# Patient Record
Sex: MALE | Race: WHITE | URBAN - METROPOLITAN AREA
[De-identification: names, ages, dates, MRNs, and addresses within clinical notes are randomized per-mention and may not be internally consistent; named-entity substitution may affect disease eponyms.]

---

## 2018-01-24 ENCOUNTER — OFFICE VISIT (OUTPATIENT)
Dept: FAMILY MEDICINE CLINIC | Age: 68
End: 2018-01-24

## 2018-01-24 VITALS
HEIGHT: 74 IN | OXYGEN SATURATION: 94 % | DIASTOLIC BLOOD PRESSURE: 80 MMHG | TEMPERATURE: 97.8 F | BODY MASS INDEX: 34.37 KG/M2 | RESPIRATION RATE: 12 BRPM | WEIGHT: 267.8 LBS | HEART RATE: 67 BPM | SYSTOLIC BLOOD PRESSURE: 110 MMHG

## 2018-01-24 DIAGNOSIS — J06.9 UPPER RESPIRATORY INFECTION, ACUTE: ICD-10-CM

## 2018-01-24 DIAGNOSIS — J20.9 ACUTE BRONCHITIS, UNSPECIFIED ORGANISM: Primary | ICD-10-CM

## 2018-01-24 DIAGNOSIS — Z96.642 H/O TOTAL HIP ARTHROPLASTY, LEFT: ICD-10-CM

## 2018-01-24 PROCEDURE — 99213 OFFICE O/P EST LOW 20 MIN: CPT | Performed by: PHYSICIAN ASSISTANT

## 2018-01-24 RX ORDER — BENZONATATE 100 MG/1
200 CAPSULE ORAL 3 TIMES DAILY PRN
Qty: 30 CAPSULE | Refills: 0 | Status: SHIPPED | OUTPATIENT
Start: 2018-01-24 | End: 2022-04-18

## 2018-01-24 RX ORDER — AZITHROMYCIN 250 MG/1
250 TABLET, FILM COATED ORAL DAILY
Qty: 1 PACKET | Refills: 0 | Status: SHIPPED | OUTPATIENT
Start: 2018-01-24 | End: 2022-04-18

## 2018-01-24 NOTE — PATIENT INSTRUCTIONS
good.  When should you call for help? Call 911 anytime you think you may need emergency care. For example, call if:  ? · You have severe trouble breathing. ?Call your doctor now or seek immediate medical care if:  ? · You have new or worse trouble breathing. ? · You cough up dark brown or bloody mucus (sputum). ? · You have a new or higher fever. ? · You have a new rash. ? Watch closely for changes in your health, and be sure to contact your doctor if:  ? · You cough more deeply or more often, especially if you notice more mucus or a change in the color of your mucus. ? · You are not getting better as expected. Where can you learn more? Go to https://[employer]. org and sign in to your [employer]Fig account. Enter H333 in the SETiT box to learn more about \"Bronchitis: Care Instructions. \"     If you do not have an account, please click on the \"Sign Up Now\" link. Current as of: May 12, 2017  Content Version: 11.5  © 1179-6806 Healthwise, Incorporated. Care instructions adapted under license by [facility] ([facility]). If you have questions about a medical condition or this instruction, always ask your healthcare professional. [address] any warranty or liability for your use of this information.

## 2018-01-24 NOTE — PROGRESS NOTES
[address]        CC:    Chief Complaint   Patient presents with    Cough     x 3 wks     Congestion     Chest x 3 weeks        HPI:  Kevin Tyler is a 78 y.o. male complains of as per chief complaint. This  Began with head congestion and moved to chest.  Symptoms :  sore throat: No.   Head congestion: Yes. Headache: No.   Sinus pressure: No.  Rhinorrhea: Yes- with white exudates. Ear fullness/ otalgia: No, with tinnitus. Fever or chills: No.   Myalgias: No. Malaise: No.     Cough is dry and tickling. No phlegm. Loss of appetite: Yes. Nausea: No. Denies rash. History of asthma or allergies: No . Smoke: No.   Sick contacts: Yes. Flu vax received: No.    Remedies tried include: OTC meds    ROS:  Reviewed and otherwise negative for other constitutional, neurologic, ENT, cardiac, pulmonary, GI,  or other musculoskeletal or extremity complaints. HISTORY: Reviewed    MEDICATIONS:  No current outpatient prescriptions on file prior to visit. No current facility-administered medications on file prior to visit. ALLERGIES:  No Known Allergies    PHYSICAL EXAMINATION:  /80   Pulse 67   Temp 97.8 °F (36.6 °C)   Resp 12   Ht 6' 2\" (1.88 m)   Wt 267 lb 12.8 oz (121.5 kg)   SpO2 94%   BMI 34.38 kg/m²   GENERAL APPEARANCE:  Pleasant, friendly. Well-nourished. Looks in no distress. HEAD: Normocephalic. EYES:  EOMI, PERRLA. Conjunctivae pink and moist. Sclera white. EARS:   Negative pinna pull. Left canal with cerumen impacted, Right canal-clear. TM with clear bulge. No mastoid tenderness. NOSE:   No septal deviation. Nares are boggy and congested. MOUTH:   Lips without lesions or cracking. Moist mucosa. THROAT: erythemic Without exudates,  edema. NECK:no  lymphadenopathy. HEART:  Regular rate and rhythm. No murmurs, rubs, or gallops. LUNGS: Clear to auscultation without wheezes, rales, or rhonchi. Equal resonance to percussion.   ABDOMEN:

## 2022-04-18 ENCOUNTER — OFFICE VISIT (OUTPATIENT)
Dept: PRIMARY CARE CLINIC | Age: 72
End: 2022-04-18
Payer: COMMERCIAL

## 2022-04-18 VITALS
DIASTOLIC BLOOD PRESSURE: 77 MMHG | HEART RATE: 63 BPM | HEIGHT: 74 IN | WEIGHT: 260 LBS | SYSTOLIC BLOOD PRESSURE: 151 MMHG | BODY MASS INDEX: 33.37 KG/M2 | TEMPERATURE: 97.2 F

## 2022-04-18 DIAGNOSIS — Z96.642 H/O TOTAL HIP ARTHROPLASTY, LEFT: ICD-10-CM

## 2022-04-18 DIAGNOSIS — M75.102 LEFT ROTATOR CUFF TEAR ARTHROPATHY: Primary | ICD-10-CM

## 2022-04-18 DIAGNOSIS — M99.00 SOMATIC DYSFUNCTION OF HEAD REGION: ICD-10-CM

## 2022-04-18 DIAGNOSIS — M99.03 SOMATIC DYSFUNCTION OF LUMBAR REGION: ICD-10-CM

## 2022-04-18 DIAGNOSIS — M99.05 SOMATIC DYSFUNCTION OF PELVIC REGION: ICD-10-CM

## 2022-04-18 DIAGNOSIS — M12.812 LEFT ROTATOR CUFF TEAR ARTHROPATHY: Primary | ICD-10-CM

## 2022-04-18 DIAGNOSIS — M99.01 SOMATIC DYSFUNCTION OF CERVICAL REGION: ICD-10-CM

## 2022-04-18 DIAGNOSIS — M99.07 SOMATIC DYSFUNCTION OF UPPER EXTREMITY: ICD-10-CM

## 2022-04-18 DIAGNOSIS — M47.817 LUMBOSACRAL SPONDYLOSIS WITHOUT MYELOPATHY: ICD-10-CM

## 2022-04-18 PROBLEM — R03.0 FINDING OF ABOVE NORMAL BLOOD PRESSURE: Status: ACTIVE | Noted: 2022-02-28

## 2022-04-18 PROBLEM — B35.1 ONYCHOMYCOSIS: Status: ACTIVE | Noted: 2022-02-28

## 2022-04-18 PROBLEM — E03.8 SUBCLINICAL HYPOTHYROIDISM: Status: ACTIVE | Noted: 2022-02-13

## 2022-04-18 PROBLEM — K63.5 POLYP OF COLON: Status: ACTIVE | Noted: 2022-02-28

## 2022-04-18 PROCEDURE — 99203 OFFICE O/P NEW LOW 30 MIN: CPT | Performed by: STUDENT IN AN ORGANIZED HEALTH CARE EDUCATION/TRAINING PROGRAM

## 2022-04-18 PROCEDURE — 98927 OSTEOPATH MANJ 5-6 REGIONS: CPT | Performed by: STUDENT IN AN ORGANIZED HEALTH CARE EDUCATION/TRAINING PROGRAM

## 2022-04-18 PROCEDURE — G8417 CALC BMI ABV UP PARAM F/U: HCPCS | Performed by: STUDENT IN AN ORGANIZED HEALTH CARE EDUCATION/TRAINING PROGRAM

## 2022-04-18 PROCEDURE — 4040F PNEUMOC VAC/ADMIN/RCVD: CPT | Performed by: STUDENT IN AN ORGANIZED HEALTH CARE EDUCATION/TRAINING PROGRAM

## 2022-04-18 PROCEDURE — 1123F ACP DISCUSS/DSCN MKR DOCD: CPT | Performed by: STUDENT IN AN ORGANIZED HEALTH CARE EDUCATION/TRAINING PROGRAM

## 2022-04-18 PROCEDURE — 1036F TOBACCO NON-USER: CPT | Performed by: STUDENT IN AN ORGANIZED HEALTH CARE EDUCATION/TRAINING PROGRAM

## 2022-04-18 PROCEDURE — G8428 CUR MEDS NOT DOCUMENT: HCPCS | Performed by: STUDENT IN AN ORGANIZED HEALTH CARE EDUCATION/TRAINING PROGRAM

## 2022-04-18 PROCEDURE — 3017F COLORECTAL CA SCREEN DOC REV: CPT | Performed by: STUDENT IN AN ORGANIZED HEALTH CARE EDUCATION/TRAINING PROGRAM

## 2022-04-18 ASSESSMENT — ENCOUNTER SYMPTOMS
CONSTIPATION: 0
DIARRHEA: 0
BACK PAIN: 1

## 2022-04-18 NOTE — PATIENT INSTRUCTIONS

## 2022-04-18 NOTE — PROGRESS NOTES
[facility]  Osteopathic Manipulative Treatment  2022     Patient:  Miley Bragg                                               : 1950  Age: 80 y.o. Date : 2022    Miley Bragg is a 80 y.o. male who presents for :      Chief Complaint   Patient presents with    Other     omt, body pain all over        ASSESSMENT/PLAN    Osteopathic structural exam performed on cranial, cervical, thoracic, lumbar, pelvic and sacral regions. Somatic dysfunction diagnoses: Right anterior pelvic torsion, L2-4 left muscle hypertonicity, left glenohumeral joint restriction, upper crossed syndrome, C3 extended rotated side bent right, bilateral suboccipital mm restriction. Osteopathic manipulative treatment (OMT) performed: Muscle energy pelvis, myofascial release lumbar spine, Bharath technique left glenohumeral joint, suboccipital release, CV 4, trapezius blast, thoracic outlet release. Patient reports increased range of motion and decreased tenderness in areas treated. Patient advised of possible side effects including generalized myalgias for 24 hours after treatment. Diagnosis Orders   1. Left rotator cuff tear arthropathy     2. Lumbosacral spondylosis without myelopathy     3. H/O total hip arthroplasty, left     4. Somatic dysfunction of lumbar region     5. Somatic dysfunction of pelvic region     6. Somatic dysfunction of head region     7. Somatic dysfunction of upper extremity     8. Somatic dysfunction of cervical region          Return in about 2 weeks (around 2022) for OMT . Complete MRI as scheduled for left shoulder to assess extent of rotator cuff tear, continue ibuprofen as needed. Counseling given on proper posture. Generalized flexibility improvement via routine stretching recommended. Follow-up with PCP for health maintenance screening, elevated blood pressure. HPI  Patient presents today for osteopathic assessment and treatment.     Patient has a history of lumbar DDD, left hip OA status post total arthroplasty, left rotator cuff tear. He notes a generalized feeling of stiffness, left shoulder pain. When he walks [facility], he states that he experiences fatigue, joint pain which precludes him from completing a walk. Exercise includes half hour of weightlifting per week in addition to walking. He admittedly does not stretch. He has not seen a PCP for the past 3 years, but states that he completed screening blood work through his [employer], and the results were normal.  It is unclear which labs are completed. He is a right side sleeper. He is left-handed. He is currently following with orthopedic surgery for work-up of left rotator cuff arthropathy. Patient works as a salesperson for [employer], and notes that he spends a significant amount of time driving or seated at a computer. Review of Systems   Constitutional: Negative for activity change, fever and unexpected weight change. Eyes: Negative for visual disturbance. Cardiovascular: Negative for leg swelling. Gastrointestinal: Negative for constipation and diarrhea. Genitourinary: Negative for difficulty urinating and dysuria. Musculoskeletal: Positive for arthralgias, back pain and neck stiffness. Negative for gait problem, joint swelling and neck pain. Neurological: Negative for weakness, numbness and headaches. Past Medical History:   Diagnosis Date    Former cigarette smoker 8/20/2013    Hip pain     Lumbar radiculitis 9/18/2013    Primary localized osteoarthrosis, pelvic region and thigh 9/4/2013    Subclinical hypothyroidism 2/13/2022       Past Surgical History:   Procedure Laterality Date    EYE SURGERY Left 1959    eye trauma     KNEE SURGERY Right 10yo       No current outpatient medications on file. No current facility-administered medications for this visit.        BP (!) 151/77 (Site: Right Upper Arm, Position: Sitting, Cuff Size: Large Adult)   Pulse 63 Temp 97.2 °F (36.2 °C) (Temporal)   Ht 6' 2\" (1.88 m)   Wt 260 lb (117.9 kg)   BMI 33.38 kg/m²     PHYSICAL EXAM  Physical Exam  Vitals reviewed. Constitutional:       General: He is not in acute distress. Appearance: Normal appearance. He is obese. HENT:      Head: Normocephalic and atraumatic. Musculoskeletal:         General: No deformity or signs of injury. Normal range of motion. Cervical back: Normal range of motion. Right lower leg: No edema. Left lower leg: No edema. Skin:     General: Skin is warm and dry. Capillary Refill: Capillary refill takes less than 2 seconds. Neurological:      General: No focal deficit present. Mental Status: He is alert. Sensory: No sensory deficit. Motor: No weakness. Gait: Gait normal.   Psychiatric:         Mood and Affect: Mood normal.         Behavior: Behavior normal.         Compa Dan DO       This dictation was generated by voice recognition computer software. Although all attempts are made to edit the dictation for accuracy, there may be errors in the transcription that are not intended.

## 2022-05-02 ENCOUNTER — OFFICE VISIT (OUTPATIENT)
Dept: PRIMARY CARE CLINIC | Age: 72
End: 2022-05-02
Payer: COMMERCIAL

## 2022-05-02 VITALS
WEIGHT: 255 LBS | TEMPERATURE: 96.6 F | BODY MASS INDEX: 32.73 KG/M2 | DIASTOLIC BLOOD PRESSURE: 75 MMHG | HEIGHT: 74 IN | HEART RATE: 64 BPM | SYSTOLIC BLOOD PRESSURE: 144 MMHG

## 2022-05-02 DIAGNOSIS — M75.102 NONTRAUMATIC TEAR OF LEFT ROTATOR CUFF, UNSPECIFIED TEAR EXTENT: Primary | ICD-10-CM

## 2022-05-02 DIAGNOSIS — M99.00 SOMATIC DYSFUNCTION OF HEAD REGION: ICD-10-CM

## 2022-05-02 DIAGNOSIS — E66.09 CLASS 1 OBESITY DUE TO EXCESS CALORIES WITH SERIOUS COMORBIDITY IN ADULT, UNSPECIFIED BMI: ICD-10-CM

## 2022-05-02 DIAGNOSIS — M99.07 SOMATIC DYSFUNCTION OF UPPER EXTREMITY: ICD-10-CM

## 2022-05-02 DIAGNOSIS — R03.0 ELEVATED BLOOD PRESSURE READING IN OFFICE WITHOUT DIAGNOSIS OF HYPERTENSION: ICD-10-CM

## 2022-05-02 DIAGNOSIS — M99.01 SOMATIC DYSFUNCTION OF CERVICAL REGION: ICD-10-CM

## 2022-05-02 PROBLEM — E66.811 CLASS 1 OBESITY DUE TO EXCESS CALORIES WITH SERIOUS COMORBIDITY IN ADULT: Status: ACTIVE | Noted: 2022-05-02

## 2022-05-02 PROCEDURE — 1123F ACP DISCUSS/DSCN MKR DOCD: CPT | Performed by: STUDENT IN AN ORGANIZED HEALTH CARE EDUCATION/TRAINING PROGRAM

## 2022-05-02 PROCEDURE — 99213 OFFICE O/P EST LOW 20 MIN: CPT | Performed by: STUDENT IN AN ORGANIZED HEALTH CARE EDUCATION/TRAINING PROGRAM

## 2022-05-02 PROCEDURE — 3017F COLORECTAL CA SCREEN DOC REV: CPT | Performed by: STUDENT IN AN ORGANIZED HEALTH CARE EDUCATION/TRAINING PROGRAM

## 2022-05-02 PROCEDURE — 4040F PNEUMOC VAC/ADMIN/RCVD: CPT | Performed by: STUDENT IN AN ORGANIZED HEALTH CARE EDUCATION/TRAINING PROGRAM

## 2022-05-02 PROCEDURE — G8427 DOCREV CUR MEDS BY ELIG CLIN: HCPCS | Performed by: STUDENT IN AN ORGANIZED HEALTH CARE EDUCATION/TRAINING PROGRAM

## 2022-05-02 PROCEDURE — 98926 OSTEOPATH MANJ 3-4 REGIONS: CPT | Performed by: STUDENT IN AN ORGANIZED HEALTH CARE EDUCATION/TRAINING PROGRAM

## 2022-05-02 PROCEDURE — 1036F TOBACCO NON-USER: CPT | Performed by: STUDENT IN AN ORGANIZED HEALTH CARE EDUCATION/TRAINING PROGRAM

## 2022-05-02 PROCEDURE — G8417 CALC BMI ABV UP PARAM F/U: HCPCS | Performed by: STUDENT IN AN ORGANIZED HEALTH CARE EDUCATION/TRAINING PROGRAM

## 2022-05-02 ASSESSMENT — ENCOUNTER SYMPTOMS
CONSTIPATION: 0
BACK PAIN: 1
DIARRHEA: 0

## 2022-05-02 NOTE — PROGRESS NOTES
[facility]  Osteopathic Manipulative Treatment  2022     Patient:  Livier Montero                                               : 1950  Age: 80 y.o. Date : 2022    Livier Montero is a 80 y.o. male who presents for :      Chief Complaint   Patient presents with    Other     omt        ASSESSMENT/PLAN    Osteopathic structural exam performed on cranial, cervical, thoracic, lumbar, pelvic and sacral regions. Somatic dysfunction diagnoses: C2-C7 extended rotated side bent left, bilateral glenohumeral joint restriction, OA rotated left. Osteopathic manipulative treatment (OMT) performed: Suboccipital release, CV 4, muscle energy cervical spine, Bharath technique bilateral glenohumeral joint. Patient reports increased range of motion and decreased tenderness in areas treated. Patient advised of possible side effects including generalized myalgias for 24 hours after treatment. Diagnosis Orders   1. Nontraumatic tear of left rotator cuff, unspecified tear extent   pain, range of motion restriction improving with OMT and home physical therapy exercises. Continue to monitor progress. 2. Somatic dysfunction of head region   per above   3. Somatic dysfunction of cervical region   per above   4. Somatic dysfunction of upper extremity   per above   5. Elevated blood pressure reading in office without diagnosis of hypertension   follow-up for physical, recheck blood pressure and initiate antihypertensive if persistently elevated   6. Class 1 obesity due to excess calories with serious comorbidity in adult, unspecified BMI   complicates all aspects of patient's care        Return in about 2 weeks (around 2022) for annual physical.    HPI  Patient presents today for follow-up OMT visit for neck, upper back pain, left rotator cuff tear. Endorses continued tightness in his neck and upper back.   Since our previous appointment, he has increase his cardiovascular exercise to 3 times per week. He has found hills in his neighborhood to hike. Notes increased endurance. He has not initiated a stretching routine. He denies numbness, weakness or tingling of his bilateral upper extremity. Overall, pain in his left shoulder site of his rotator cuff tearhas been improving. Review of Systems   Constitutional: Negative for activity change, fever and unexpected weight change. Eyes: Negative for visual disturbance. Cardiovascular: Negative for leg swelling. Gastrointestinal: Negative for constipation and diarrhea. Musculoskeletal: Positive for back pain and neck pain. Negative for gait problem and neck stiffness. Neurological: Negative for weakness, numbness and headaches. Past Medical History:   Diagnosis Date    Class 1 obesity due to excess calories with serious comorbidity in adult 5/2/2022    Former cigarette smoker 8/20/2013    Hip pain     Lumbar radiculitis 9/18/2013    Primary localized osteoarthrosis, pelvic region and thigh 9/4/2013    Subclinical hypothyroidism 2/13/2022       Past Surgical History:   Procedure Laterality Date    EYE SURGERY Left 1959    eye trauma     KNEE SURGERY Right 8yo       No current outpatient medications on file. No current facility-administered medications for this visit. BP (!) 144/75 (Site: Right Upper Arm, Position: Sitting, Cuff Size: Large Adult)   Pulse 64   Temp 96.6 °F (35.9 °C) (Temporal)   Ht 6' 2\" (1.88 m)   Wt 255 lb (115.7 kg)   BMI 32.74 kg/m²     PHYSICAL EXAM  Physical Exam  Vitals reviewed. Constitutional:       General: He is not in acute distress. Appearance: Normal appearance. HENT:      Head: Normocephalic and atraumatic. Musculoskeletal:         General: Tenderness present. No deformity or signs of injury. Cervical back: Normal range of motion. Right lower leg: No edema. Left lower leg: No edema. Skin:     General: Skin is warm and dry.       Capillary Refill: Capillary refill takes less than 2 seconds. Neurological:      General: No focal deficit present. Mental Status: He is alert. Sensory: No sensory deficit. Motor: No weakness. Gait: Gait normal.   Psychiatric:         Mood and Affect: Mood normal.         Behavior: Behavior normal.         Robel Sarah DO       This dictation was generated by voice recognition computer software. Although all attempts are made to edit the dictation for accuracy, there may be errors in the transcription that are not intended.

## 2022-06-01 ENCOUNTER — OFFICE VISIT (OUTPATIENT)
Dept: PRIMARY CARE CLINIC | Age: 72
End: 2022-06-01
Payer: COMMERCIAL

## 2022-06-01 VITALS
SYSTOLIC BLOOD PRESSURE: 142 MMHG | HEART RATE: 68 BPM | WEIGHT: 254.4 LBS | TEMPERATURE: 97 F | DIASTOLIC BLOOD PRESSURE: 74 MMHG | BODY MASS INDEX: 32.65 KG/M2 | HEIGHT: 74 IN

## 2022-06-01 DIAGNOSIS — I10 PRIMARY HYPERTENSION: Primary | ICD-10-CM

## 2022-06-01 DIAGNOSIS — Z96.642 H/O TOTAL HIP ARTHROPLASTY, LEFT: ICD-10-CM

## 2022-06-01 DIAGNOSIS — E66.09 CLASS 1 OBESITY DUE TO EXCESS CALORIES WITH SERIOUS COMORBIDITY IN ADULT, UNSPECIFIED BMI: ICD-10-CM

## 2022-06-01 PROBLEM — R03.0 ELEVATED BLOOD PRESSURE READING IN OFFICE WITHOUT DIAGNOSIS OF HYPERTENSION: Status: RESOLVED | Noted: 2022-05-02 | Resolved: 2022-06-01

## 2022-06-01 PROCEDURE — 90715 TDAP VACCINE 7 YRS/> IM: CPT | Performed by: STUDENT IN AN ORGANIZED HEALTH CARE EDUCATION/TRAINING PROGRAM

## 2022-06-01 PROCEDURE — 1123F ACP DISCUSS/DSCN MKR DOCD: CPT | Performed by: STUDENT IN AN ORGANIZED HEALTH CARE EDUCATION/TRAINING PROGRAM

## 2022-06-01 PROCEDURE — 3017F COLORECTAL CA SCREEN DOC REV: CPT | Performed by: STUDENT IN AN ORGANIZED HEALTH CARE EDUCATION/TRAINING PROGRAM

## 2022-06-01 PROCEDURE — G8417 CALC BMI ABV UP PARAM F/U: HCPCS | Performed by: STUDENT IN AN ORGANIZED HEALTH CARE EDUCATION/TRAINING PROGRAM

## 2022-06-01 PROCEDURE — G8427 DOCREV CUR MEDS BY ELIG CLIN: HCPCS | Performed by: STUDENT IN AN ORGANIZED HEALTH CARE EDUCATION/TRAINING PROGRAM

## 2022-06-01 PROCEDURE — 1036F TOBACCO NON-USER: CPT | Performed by: STUDENT IN AN ORGANIZED HEALTH CARE EDUCATION/TRAINING PROGRAM

## 2022-06-01 PROCEDURE — 99214 OFFICE O/P EST MOD 30 MIN: CPT | Performed by: STUDENT IN AN ORGANIZED HEALTH CARE EDUCATION/TRAINING PROGRAM

## 2022-06-01 PROCEDURE — 90471 IMMUNIZATION ADMIN: CPT | Performed by: STUDENT IN AN ORGANIZED HEALTH CARE EDUCATION/TRAINING PROGRAM

## 2022-06-01 ASSESSMENT — PATIENT HEALTH QUESTIONNAIRE - PHQ9
SUM OF ALL RESPONSES TO PHQ QUESTIONS 1-9: 0
2. FEELING DOWN, DEPRESSED OR HOPELESS: 0
SUM OF ALL RESPONSES TO PHQ QUESTIONS 1-9: 0
1. LITTLE INTEREST OR PLEASURE IN DOING THINGS: 0
SUM OF ALL RESPONSES TO PHQ9 QUESTIONS 1 & 2: 0

## 2022-06-01 ASSESSMENT — ENCOUNTER SYMPTOMS
SHORTNESS OF BREATH: 0
WHEEZING: 0
NAUSEA: 0

## 2022-06-01 NOTE — PROGRESS NOTES
[facility]  2022    Boone Jimenez (:  1950) is a 80 y.o. male, here for evaluation of the following medical concerns:    Chief Complaint   Patient presents with    Annual Exam        ASSESSMENT/ PLAN  1. Primary hypertension  Uncontrolled, this is a new problem. Recommended initiation of lisinopril, patient declines today in favor of weight loss and dietary changes. Discussed DASH diet. Will initiate antihypertensive at follow-up appointment if persistently elevated. Check blood pressure at home and bring cuff to follow-up appointment. 2. H/O total hip arthroplasty, left  With gait instability. Recommended continuing Tylenol as needed, and patient would like to follow-up with orthopedic surgeon to see if any further work-up is needed for hip. Patient declines physical therapy referral today. Minimal improvement with OMT previously. 3. Class 1 obesity due to excess calories with serious comorbidity in adult, unspecified BMI  Complicates all aspects of patient's care  - Comprehensive Metabolic Panel; Future  - Hemoglobin A1C; Future  - Lipid Panel; Future     Patient declines pneumonia vaccine today. Records request for colonoscopy. Return in about 4 weeks (around 2022) for blood pressure follow-up. HPI  Patient presents today for follow-up on hypertension, gait instability. Denies previous history of hypertension. Does not currently take blood pressure at home. Denies lightheadedness, dizziness, chest pain, shortness of breath or lower extremity swelling. States he does consume a diet high in added [address] salt. Current exercise includes walking, daily stretches. He previously participated in physical therapy for unstable gait. Endorses improvement with Tylenol. He has a history of a left total hip replacement, and endorses some joint stiffness. Denies falls, shuffled gait, numbness, weakness or tingling of his lower extremity.     States that he had a colonoscopy 2 years ago which was remarkable for polyps, repeat recommended in 5 years. Cannot recall name of the physician but states that it was done [facility]. ROS  Review of Systems   Constitutional: Negative for activity change and unexpected weight change. HENT: Negative for tinnitus. Eyes: Negative for visual disturbance. Respiratory: Negative for shortness of breath and wheezing. Cardiovascular: Negative for chest pain, palpitations and leg swelling. Gastrointestinal: Negative for nausea. Genitourinary: Negative for decreased urine volume. Neurological: Negative for syncope, light-headedness and headaches. HISTORIES  No current outpatient medications on file prior to visit. No current facility-administered medications on file prior to visit. Past Medical History:   Diagnosis Date    Class 1 obesity due to excess calories with serious comorbidity in adult 5/2/2022    Former cigarette smoker 8/20/2013    Hip pain     Lumbar radiculitis 9/18/2013    Primary localized osteoarthrosis, pelvic region and thigh 9/4/2013    Subclinical hypothyroidism 2/13/2022     Patient Active Problem List   Diagnosis    Former cigarette smoker    Primary localized osteoarthrosis, pelvic region and thigh    Lumbosacral spondylosis without myelopathy    DDD (degenerative disc disease), lumbar    H/O total hip arthroplasty, left    Onychomycosis    Polyp of colon    Subclinical hypothyroidism    Nontraumatic tear of left rotator cuff    Class 1 obesity due to excess calories with serious comorbidity in adult       PE  Vitals:    06/01/22 1027   BP: (!) 142/74   Site: Left Upper Arm   Position: Sitting   Cuff Size: Large Adult   Pulse: 68   Temp: 97 °F (36.1 °C)   TempSrc: Temporal   Weight: 254 lb 6.4 oz (115.4 kg)   Height: 6' 2\" (1.88 m)     Estimated body mass index is 32.66 kg/m² as calculated from the following:    Height as of this encounter: 6' 2\" (1.88 m).     Weight as of this encounter: 254 lb 6.4 oz (115.4 kg). Physical Exam  Vitals reviewed. Constitutional:       General: He is not in acute distress. Appearance: Normal appearance. He is obese. HENT:      Head: Normocephalic and atraumatic. Eyes:      Extraocular Movements: Extraocular movements intact. Pupils: Pupils are equal, round, and reactive to light. Cardiovascular:      Rate and Rhythm: Normal rate and regular rhythm. Pulses: Normal pulses. Heart sounds: Normal heart sounds. No murmur heard. No gallop. Pulmonary:      Effort: Pulmonary effort is normal.      Breath sounds: Normal breath sounds. No wheezing or rales. Abdominal:      General: Abdomen is flat. Palpations: Abdomen is soft. Musculoskeletal:      Right lower leg: No edema. Left lower leg: No edema. Skin:     General: Skin is warm and dry. Neurological:      Mental Status: He is alert. Paxton Hobson DO    This dictation was generated by voice recognition computer software. Although all attempts are made to edit the dictation for accuracy, there may be errors in the transcription that are not intended.

## 2022-06-01 NOTE — PATIENT INSTRUCTIONS
Bring your blood pressure cuff and log to your follow up appointment       Patient Education        DASH Diet: Care Instructions  Your Care Instructions     The DASH diet is an eating plan that can help lower your blood pressure. DASH stands for Dietary Approaches to Stop Hypertension. Hypertension is high bloodpressure. The DASH diet focuses on eating foods that are high in calcium, potassium, and magnesium. These nutrients can lower blood pressure. The foods that are highest in these nutrients are fruits, vegetables, low-fat dairy products, nuts, seeds, and legumes. But taking calcium, potassium, and magnesium supplements instead of eating foods that are high in those nutrients does not have the same effect. The DASH diet also includes whole grains, fish, and poultry. The DASH diet is one of several lifestyle changes your doctor may recommend to lower your high blood pressure. Your doctor may also want you to decrease the amount of sodium in your diet. Lowering sodium while following the DASH dietcan lower blood pressure even further than just the DASH diet alone. Follow-up care is a key part of your treatment and safety. Be sure to make and go to all appointments, and call your doctor if you are having problems. It's also a good idea to know your test results and keep alist of the medicines you take. How can you care for yourself at home? Following the DASH diet   Eat 4 to 5 servings of fruit each day. A serving is 1 medium-sized piece of fruit, ½ cup chopped or canned fruit, 1/4 cup dried fruit, or 4 ounces (½ cup) of fruit juice. Choose fruit more often than fruit juice.  Eat 4 to 5 servings of vegetables each day. A serving is 1 cup of lettuce or raw leafy vegetables, ½ cup of chopped or cooked vegetables, or 4 ounces (½ cup) of vegetable juice. Choose vegetables more often than vegetable juice.  Get 2 to 3 servings of low-fat and fat-free dairy each day.  A serving is 8 ounces of milk, 1 cup of yogurt, or 1 ½ ounces of cheese.  Eat 6 to 8 servings of grains each day. A serving is 1 slice of bread, 1 ounce of dry cereal, or ½ cup of cooked rice, pasta, or cooked cereal. Try to choose whole-grain products as much as possible.  Limit lean meat, poultry, and fish to 2 servings each day. A serving is 3 ounces, about the size of a deck of cards.  Eat 4 to 5 servings of nuts, seeds, and legumes (cooked dried beans, lentils, and split peas) each week. A serving is 1/3 cup of nuts, 2 tablespoons of seeds, or ½ cup of cooked beans or peas.  Limit fats and oils to 2 to 3 servings each day. A serving is 1 teaspoon of vegetable oil or 2 tablespoons of salad dressing.  Limit sweets and added sugars to 5 servings or less a week. A serving is 1 tablespoon jelly or jam, ½ cup sorbet, or 1 cup of lemonade.  Eat less than 2,300 milligrams (mg) of sodium a day. If you limit your sodium to 1,500 mg a day, you can lower your blood pressure even more.  Be aware that all of these are the suggested number of servings for people who eat 1,800 to 2,000 calories a day. Your recommended number of servings may be different if you need more or fewer calories. Tips for success   Start small. Do not try to make dramatic changes to your diet all at once. You might feel that you are missing out on your favorite foods and then be more likely to not follow the plan. Make small changes, and stick with them. Once those changes become habit, add a few more changes.  Try some of the following:  ? Make it a goal to eat a fruit or vegetable at every meal and at snacks. This will make it easy to get the recommended amount of fruits and vegetables each day. ? Try yogurt topped with fruit and nuts for a snack or healthy dessert. ? Add lettuce, tomato, cucumber, and onion to sandwiches. ? Combine a ready-made pizza crust with low-fat mozzarella cheese and lots of vegetable toppings.  Try using tomatoes, squash, spinach, broccoli, carrots, cauliflower, and onions. ? Have a variety of cut-up vegetables with a low-fat dip as an appetizer instead of chips and dip. ? Sprinkle sunflower seeds or chopped almonds over salads. Or try adding chopped walnuts or almonds to cooked vegetables. ? Try some vegetarian meals using beans and peas. Add garbanzo or kidney beans to salads. Make burritos and tacos with mashed rivera beans or black beans. Where can you learn more? Go to https://[employer]. org and sign in to your [employer] account. Enter P586 in the [facility] box to learn more about \"DASH Diet: Care Instructions. \"     If you do not have an account, please click on the \"Sign Up Now\" link. Current as of: January 10, 2022               Content Version: 13.2  © 6182-8686 Healthwise, [facility]. Care instructions adapted under license by [facility] ([facility]). If you have questions about a medical condition or this instruction, always ask your healthcare professional. [place] [address] any warranty or liability for your use of this information.

## 2022-06-02 LAB
A/G RATIO: 2.4 (ref 1.1–2.2)
ALBUMIN SERPL-MCNC: 4.5 G/DL (ref 3.4–5)
ALP BLD-CCNC: 80 U/L (ref 40–129)
ALT SERPL-CCNC: 16 U/L (ref 10–40)
ANION GAP SERPL CALCULATED.3IONS-SCNC: 15 MMOL/L (ref 3–16)
AST SERPL-CCNC: 16 U/L (ref 15–37)
BILIRUB SERPL-MCNC: 0.3 MG/DL (ref 0–1)
BUN BLDV-MCNC: 17 MG/DL (ref 7–20)
CALCIUM SERPL-MCNC: 9.6 MG/DL (ref 8.3–10.6)
CHLORIDE BLD-SCNC: 103 MMOL/L (ref 99–110)
CHOLESTEROL, TOTAL: 179 MG/DL (ref 0–199)
CO2: 22 MMOL/L (ref 21–32)
CREAT SERPL-MCNC: 0.9 MG/DL (ref 0.8–1.3)
ESTIMATED AVERAGE GLUCOSE: 102.5 MG/DL
GFR AFRICAN AMERICAN: >60
GFR NON-AFRICAN AMERICAN: >60
GLUCOSE BLD-MCNC: 115 MG/DL (ref 70–99)
HBA1C MFR BLD: 5.2 %
HDLC SERPL-MCNC: 42 MG/DL (ref 40–60)
LDL CHOLESTEROL CALCULATED: 119 MG/DL
POTASSIUM SERPL-SCNC: 4.2 MMOL/L (ref 3.5–5.1)
SODIUM BLD-SCNC: 140 MMOL/L (ref 136–145)
TOTAL PROTEIN: 6.4 G/DL (ref 6.4–8.2)
TRIGL SERPL-MCNC: 90 MG/DL (ref 0–150)
VLDLC SERPL CALC-MCNC: 18 MG/DL

## 2022-11-08 ENCOUNTER — OFFICE VISIT (OUTPATIENT)
Dept: ORTHOPEDIC SURGERY | Age: 72
End: 2022-11-08
Payer: COMMERCIAL

## 2022-11-08 VITALS — HEIGHT: 74 IN | BODY MASS INDEX: 32.6 KG/M2 | WEIGHT: 254 LBS

## 2022-11-08 DIAGNOSIS — R53.1 WEAKNESS GENERALIZED: Primary | ICD-10-CM

## 2022-11-08 PROCEDURE — 99203 OFFICE O/P NEW LOW 30 MIN: CPT | Performed by: ORTHOPAEDIC SURGERY

## 2022-11-08 PROCEDURE — 1123F ACP DISCUSS/DSCN MKR DOCD: CPT | Performed by: ORTHOPAEDIC SURGERY

## 2022-11-08 PROCEDURE — 3017F COLORECTAL CA SCREEN DOC REV: CPT | Performed by: ORTHOPAEDIC SURGERY

## 2022-11-08 PROCEDURE — G8484 FLU IMMUNIZE NO ADMIN: HCPCS | Performed by: ORTHOPAEDIC SURGERY

## 2022-11-08 PROCEDURE — 1036F TOBACCO NON-USER: CPT | Performed by: ORTHOPAEDIC SURGERY

## 2022-11-08 PROCEDURE — G8417 CALC BMI ABV UP PARAM F/U: HCPCS | Performed by: ORTHOPAEDIC SURGERY

## 2022-11-08 PROCEDURE — G8427 DOCREV CUR MEDS BY ELIG CLIN: HCPCS | Performed by: ORTHOPAEDIC SURGERY

## 2022-11-08 NOTE — PROGRESS NOTES
[facility]  Outpatient Progress Note  Jonelle Hogan MD    Patient Name: Lucy Valdes MRN: [id number]   Age: 71 y.o. YOB: 1950   Sex: male      [address] Complaint   Patient presents with    New Patient     Bilateral leg problems        HISTORY OF PRESENT ILLNESS   Lucy Valdes is a 71 y.o. male presents the office today for evaluation of complaints of generalized weakness and decreased stamina. The patient states that he first noticed these complaints about 2 months ago. He had ordered some carryout food and walked to the restaurant to  his meal and states he had difficulty making it back to his house. He reports that he just felt he did not have the strength or endurance to make it home. He denied any pain. He states he has never really experienced these symptoms before however review of the patient's medical record indicates that he has complained of similar difficulty intermittently since 2013. He has been well worked up in the past with an MRI scan of the lumbar spine, EMG of the lower extremity and ankle brachial indices/vascular studies. No significant pathology had been identified. He had been followed for a period of time by a physical medicine and rehabilitation doctor and participated in some physical therapy. He had discussed similar complaints with his primary care physician in June 2022. She recommended some physical therapy. The patient states he is not satisfied with his current primary care provider and wants to identify a new physician. He states that the current physician had recommended some new medication for him and he was not in agreement with that plan of care. Patient denies low back pain. He denies cramping in his legs or other pain symptoms.   He has some mild numbness in the plantar feet and has been told he has some peripheral neuropathy in the past.    PAST MEDICAL HISTORY      Past Medical History: Diagnosis Date    Class 1 obesity due to excess calories with serious comorbidity in adult 5/2/2022    Former cigarette smoker 8/20/2013    Hip pain     Lumbar radiculitis 9/18/2013    Primary localized osteoarthrosis, pelvic region and thigh 9/4/2013    Subclinical hypothyroidism 2/13/2022       PAST SURGICAL HISTORY     Past Surgical History:   Procedure Laterality Date    EYE SURGERY Left 1959    eye trauma     KNEE SURGERY Right 8yo       MEDICATIONS     No current outpatient medications on file. No current facility-administered medications for this visit. ALLERGIES   No Known Allergies    FAMILY HISTORY     Family History   Problem Relation Age of Onset    Dementia Mother 80    Emphysema Father     Alzheimer's Disease Brother        SOCIAL HISTORY     Social History     Socioeconomic History    Marital status:      Spouse name: None    Number of children: None    Years of education: None    Highest education level: None   Occupational History    Occupation: [employer]     Comment: sales   Tobacco Use    Smoking status: Never    Smokeless tobacco: Never    Tobacco comments:     occ cigar   Substance and Sexual Activity    Alcohol use:  Yes     Alcohol/week: 42.0 standard drinks     Types: 42 Cans of beer per week     Comment: 2-4 beers per day       REVIEW OF SYSTEMS   General: no fever, chills, night sweats, anorexia, malaise, fatigue, or weight change  Hematologic:  no unexplained bleeding or bruising  HEENT:   no nasal congestion, rhinorrhea, sore throat, or facial pain  Respiratory:  no cough, dyspnea, or chest pain  Cardiovascular:  no angina, CASTELLANOS, PND, orthopnea, dependent edema, or palpitations  Gastrointestinal:  no nausea, vomiting, diarrhea, constipation, or abdominal pain  Genitourinary:  no urinary urgency, frequency, dysuria, or hematuria  Musculoskeletal: see HPI  Endocrine:  no heat or cold intolerance and no polyphagia, polydipsia, or polyuria  Skin:  no skin eruptions or changing lesions  Neurologic:  no focal weakness, numbness/tingling, tremor, or severe headache. See HPI. See HPI for pertinent positives. PHYSICAL EXAM   Vital Signs:   Vitals:    11/08/22 1023   Weight: 254 lb (115.2 kg)   Height: 6' 2\" (1.88 m)      Last BP reading 142/74    Current BMI calculated at 32.61    General appearance: healthy, alert, no distress  Skin: Skin color, texture, turgor normal. No rashes or lesions  HEENT: atraumatic, normocephalic. PERRL  Respiratory: Unlabored breathing  Lymphatic: No adenopathy   Neuro: Alert and oriented, normal distal sensation, normal bilateral DTRs  Vascular: Normal distal capillary and distal pulses  Muskuloskeletal Exam:   The patient raises easily from a seated position without assistance. He has a normal-appearing gait. He can toe and heel walk. He can climb a stair with an independent gait without difficulty. He has no tenderness in the lumbar spine SI joint or sciatic notch. Range of motion of the hips is full and pain-free. Seated straight leg raising is negative. Motor strength in the lower extremities is 5 out of 5 in all muscle groups tested. Deep tendon reflexes are 2+ and symmetric. He has no obvious sensory loss. Mild pitting edema noted in the right lower extremity.     Lab Results   Component Value Date/Time     06/01/2022 12:09 PM    K 4.2 06/01/2022 12:09 PM     06/01/2022 12:09 PM    CO2 22 06/01/2022 12:09 PM    BUN 17 06/01/2022 12:09 PM    CREATININE 0.9 06/01/2022 12:09 PM    GLUCOSE 115 06/01/2022 12:09 PM    CALCIUM 9.6 06/01/2022 12:09 PM      Lab Results   Component Value Date    CHOL 179 06/01/2022    CHOL 188 09/18/2014    CHOL 188 10/03/2013     Lab Results   Component Value Date    TRIG 90 06/01/2022    TRIG 130 09/18/2014    TRIG 155 (H) 10/03/2013     Lab Results   Component Value Date    HDL 42 06/01/2022    HDL 42 09/18/2014    HDL 36 (L) 10/03/2013     Lab Results   Component Value Date    LDLCALC 119 (H) 06/01/2022    LDLCALC 120 (H) 09/18/2014    LDLCALC 121 (H) 10/03/2013     Lab Results   Component Value Date    LABVLDL 18 06/01/2022    LABVLDL 26 09/18/2014    LABVLDL 31 10/03/2013     No results found for: SHIMON  Hemoglobin A1C   Date Value Ref Range Status   06/01/2022 5.2 See comment % Final     Comment:     Comment:  Diagnosis of Diabetes: > or = 6.5%  Increased risk of diabetes (Prediabetes): 5.7-6.4%  Glycemic Control: Nonpregnant Adults: <7.0%                    Pregnant: <6.0%               RADIOLOGY   No radiographs taken today    IMPRESSION     1. Weakness generalized         PLAN   I had a lengthy discussion with patient today regarding diagnosis and treatment options and recommendations. I do not identify any specific musculoskeletal pathology to explain the patient's current complaints. Though he is overweight and generally deconditioned his other medical diagnoses include hypertension, hypercholesterolemia, class I obesity, status post left total hip replacement. Prior studies including EMG of the lower extremities ABIs and MRI of the lumbar spine revealed no specific pathology. Best recommendation is for him to identify a new primary care physician for continued management of his overall health concerns. FOLLOWUP     Return if symptoms worsen or fail to improve. Orders Placed This Encounter   Procedures    External Referral To Primary Care     Referral Priority:   Routine     Referral Type:   Eval and Treat     Referral Reason:   Specialty Services Required     Requested Specialty:   Family Medicine     Number of Visits Requested:   1      No orders of the defined types were placed in this encounter.       Patient was instructed on appropriate use of braces, participation in home exercise programs, healthy lifestyle choices and weight loss as appropriate     Allison Bhatia MD

## 2022-11-22 ENCOUNTER — TRANSCRIBE ORDERS (OUTPATIENT)
Dept: ADMINISTRATIVE | Age: 72
End: 2022-11-22

## 2022-11-22 ENCOUNTER — HOSPITAL ENCOUNTER (OUTPATIENT)
Dept: CT IMAGING | Age: 72
Discharge: HOME | End: 2022-11-22
Payer: COMMERCIAL

## 2022-11-22 ENCOUNTER — HOSPITAL ENCOUNTER (OUTPATIENT)
Age: 72
Discharge: HOME | End: 2022-11-22
Payer: COMMERCIAL

## 2022-11-22 DIAGNOSIS — I69.990 APRAXIA AS LATE EFFECT OF CEREBROVASCULAR DISEASE: Primary | ICD-10-CM

## 2022-11-22 DIAGNOSIS — I69.990 APRAXIA AS LATE EFFECT OF CEREBROVASCULAR DISEASE: ICD-10-CM

## 2022-11-22 LAB
ALBUMIN SERPL-MCNC: 4.4 G/DL (ref 3.4–5)
ANION GAP SERPL CALCULATED.3IONS-SCNC: 9 MMOL/L (ref 3–16)
BUN BLDV-MCNC: 19 MG/DL (ref 7–20)
CALCIUM SERPL-MCNC: 8.9 MG/DL (ref 8.3–10.6)
CHLORIDE BLD-SCNC: 105 MMOL/L (ref 99–110)
CO2: 25 MMOL/L (ref 21–32)
CREAT SERPL-MCNC: 1 MG/DL (ref 0.8–1.3)
GFR SERPL CREATININE-BSD FRML MDRD: >60 ML/MIN/{1.73_M2}
GLUCOSE BLD-MCNC: 108 MG/DL (ref 70–99)
PHOSPHORUS: 3.3 MG/DL (ref 2.5–4.9)
POTASSIUM SERPL-SCNC: 3.9 MMOL/L (ref 3.5–5.1)
SODIUM BLD-SCNC: 139 MMOL/L (ref 136–145)

## 2022-11-22 PROCEDURE — 6360000004 HC RX CONTRAST MEDICATION: Performed by: NURSE PRACTITIONER

## 2022-11-22 PROCEDURE — 70450 CT HEAD/BRAIN W/O DYE: CPT

## 2022-11-22 PROCEDURE — 70498 CT ANGIOGRAPHY NECK: CPT

## 2022-11-22 PROCEDURE — 36415 COLL VENOUS BLD VENIPUNCTURE: CPT

## 2022-11-22 PROCEDURE — 80069 RENAL FUNCTION PANEL: CPT

## 2022-11-22 RX ADMIN — IOPAMIDOL 75 ML: 755 INJECTION, SOLUTION INTRAVENOUS at 17:19

## 2022-12-07 ENCOUNTER — TRANSCRIBE ORDERS (OUTPATIENT)
Dept: ADMINISTRATIVE | Age: 72
End: 2022-12-07

## 2022-12-07 DIAGNOSIS — I77.9 DISORDER OF ARTERIES AND ARTERIOLES (HCC): Primary | ICD-10-CM

## 2022-12-09 ENCOUNTER — HOSPITAL ENCOUNTER (OUTPATIENT)
Dept: VASCULAR LAB | Age: 72
Discharge: HOME | End: 2022-12-09
Payer: COMMERCIAL

## 2022-12-09 DIAGNOSIS — I77.9 DISORDER OF ARTERIES AND ARTERIOLES (HCC): ICD-10-CM

## 2022-12-09 PROCEDURE — 93880 EXTRACRANIAL BILAT STUDY: CPT

## 2023-02-20 ENCOUNTER — HOSPITAL ENCOUNTER (OUTPATIENT)
Dept: WOUND CARE | Age: 73
Discharge: HOME | End: 2023-02-20
Payer: COMMERCIAL

## 2023-02-20 ENCOUNTER — HOSPITAL ENCOUNTER (OUTPATIENT)
Dept: GENERAL RADIOLOGY | Age: 73
Discharge: HOME | End: 2023-02-20
Payer: COMMERCIAL

## 2023-02-20 DIAGNOSIS — L89.893 PRESSURE ULCER OF TOE OF RIGHT FOOT, STAGE 3 (HCC): ICD-10-CM

## 2023-02-20 DIAGNOSIS — L89.893 PRESSURE ULCER OF TOE OF RIGHT FOOT, STAGE 3 (HCC): Primary | ICD-10-CM

## 2023-02-20 DIAGNOSIS — L89.893 PRESSURE INJURY OF TOE OF RIGHT FOOT, STAGE 3 (HCC): ICD-10-CM

## 2023-02-20 LAB
C-REACTIVE PROTEIN: 7 MG/L (ref 0–5.1)
HCT VFR BLD CALC: 44.1 % (ref 40.5–52.5)
HEMOGLOBIN: 15.4 G/DL (ref 13.5–17.5)
MCH RBC QN AUTO: 30.3 PG (ref 26–34)
MCHC RBC AUTO-ENTMCNC: 34.9 G/DL (ref 31–36)
MCV RBC AUTO: 86.8 FL (ref 80–100)
PDW BLD-RTO: 13.4 % (ref 12.4–15.4)
PLATELET # BLD: 201 K/UL (ref 135–450)
PMV BLD AUTO: 8.7 FL (ref 5–10.5)
RBC # BLD: 5.08 M/UL (ref 4.2–5.9)
SEDIMENTATION RATE, ERYTHROCYTE: 15 MM/HR (ref 0–20)
WBC # BLD: 7.8 K/UL (ref 4–11)

## 2023-02-20 PROCEDURE — 99203 OFFICE O/P NEW LOW 30 MIN: CPT

## 2023-02-20 PROCEDURE — 73660 X-RAY EXAM OF TOE(S): CPT

## 2023-02-20 PROCEDURE — 11042 DBRDMT SUBQ TIS 1ST 20SQCM/<: CPT

## 2023-02-20 PROCEDURE — 6370000000 HC RX 637 (ALT 250 FOR IP): Performed by: SPECIALIST

## 2023-02-20 RX ORDER — GENTAMICIN SULFATE 1 MG/G
OINTMENT TOPICAL ONCE
OUTPATIENT
Start: 2023-02-20 | End: 2023-02-20

## 2023-02-20 RX ORDER — LIDOCAINE HYDROCHLORIDE 20 MG/ML
JELLY TOPICAL ONCE
OUTPATIENT
Start: 2023-02-20 | End: 2023-02-20

## 2023-02-20 RX ORDER — TRIAMTERENE AND HYDROCHLOROTHIAZIDE 75; 50 MG/1; MG/1
0.5 TABLET ORAL DAILY
COMMUNITY

## 2023-02-20 RX ORDER — BETAMETHASONE DIPROPIONATE 0.05 %
OINTMENT (GRAM) TOPICAL ONCE
OUTPATIENT
Start: 2023-02-20 | End: 2023-02-20

## 2023-02-20 RX ORDER — GINSENG 100 MG
CAPSULE ORAL ONCE
OUTPATIENT
Start: 2023-02-20 | End: 2023-02-20

## 2023-02-20 RX ORDER — BACITRACIN ZINC AND POLYMYXIN B SULFATE 500; 1000 [USP'U]/G; [USP'U]/G
OINTMENT TOPICAL ONCE
OUTPATIENT
Start: 2023-02-20 | End: 2023-02-20

## 2023-02-20 RX ORDER — CLOBETASOL PROPIONATE 0.5 MG/G
OINTMENT TOPICAL ONCE
OUTPATIENT
Start: 2023-02-20 | End: 2023-02-20

## 2023-02-20 RX ORDER — LIDOCAINE 40 MG/G
CREAM TOPICAL ONCE
OUTPATIENT
Start: 2023-02-20 | End: 2023-02-20

## 2023-02-20 RX ORDER — BACITRACIN, NEOMYCIN, POLYMYXIN B 400; 3.5; 5 [USP'U]/G; MG/G; [USP'U]/G
OINTMENT TOPICAL ONCE
OUTPATIENT
Start: 2023-02-20 | End: 2023-02-20

## 2023-02-20 RX ORDER — LIDOCAINE HYDROCHLORIDE 40 MG/ML
SOLUTION TOPICAL ONCE
OUTPATIENT
Start: 2023-02-20 | End: 2023-02-20

## 2023-02-20 RX ORDER — LIDOCAINE 50 MG/G
OINTMENT TOPICAL ONCE
OUTPATIENT
Start: 2023-02-20 | End: 2023-02-20

## 2023-02-20 RX ORDER — TERBINAFINE HYDROCHLORIDE 250 MG/1
250 TABLET ORAL DAILY
COMMUNITY

## 2023-02-20 RX ORDER — ATORVASTATIN CALCIUM 40 MG/1
1 TABLET, FILM COATED ORAL
COMMUNITY
Start: 2023-02-03

## 2023-02-20 RX ORDER — LIDOCAINE HYDROCHLORIDE 40 MG/ML
2.5 SOLUTION TOPICAL ONCE
Status: COMPLETED | OUTPATIENT
Start: 2023-02-20 | End: 2023-02-20

## 2023-02-20 RX ADMIN — LIDOCAINE HYDROCHLORIDE 2.5 ML: 40 SOLUTION TOPICAL at 09:14

## 2023-02-20 NOTE — DISCHARGE INSTRUCTIONS
[facility] Physician Orders and Discharge Instructions  [address] [address] [address] [address] [facility]. Home. [place]  Telephone: [phone]  NAME:  Halima Landers OF BIRTH:  1950  MEDICAL RECORD NUMBER:  [id number]  DATE: 2/20/23     Return Appointment:  Return Appointment: With Tana Be DPM  or Shakila Dykes  in  1 [facility])  [] Return Appointment for a Wound Assessment with the nurse on:     No future appointments. [address]: none  Medically necessary services: [] Skilled Nursing [] PT (Eval & Treat) [] OT (Eval & Treat) [] Social Work [] Dietician  [] Other:    Wound care instructions: If you smoke we ask that you refrain from smoking. Smoking inhibits wounds from healing. When taking antibiotics take the entire prescription as ordered. Do not stop taking until medication is all gone unless otherwise instructed. Exercise as tolerated. Keep weight off wounds and reposition every 2 hours if applicable. Do not get wounds wet in the bath or shower unless otherwise instructed by your physician. If your wound is on your foot or leg, you may purchase a cast bag. Please ask at the pharmacy. Wash hands with soap and water prior to and after every dressing change. [x]Wash wounds with: 0.9% normal saline  [x]Arianna wound Topical Treatments: Do not apply lotions, creams, or ointments to the skin around the wound bed unless directed as followed:   Apply around the wound: Nothing           [x]Wound Location: right third toe   Apply Primary Dressing to wound: Felicia  Secondary Dressing: 2X2 gauze pad, Conforming roll gauze, and foam cut out   Avoid contact of tape with skin if possible. When to change Dressing: 3 times per week:Monday/Wednesday/Friday      [x] Edema Control:      Elevate leg(s) above the level of the heart for 30 minutes 4-5 times a day and/or when sitting. Avoid prolonged standing in one place.       [x]Pressure Relief: try to keep as much pressure off right third toe    [x]Off Loading(Pressure Reduction) When: Walking  Weight Bearing Status: No Weight bearing restrictions Right;  Offloading devices:  foam cut out      Dietary:  Important dietary reminders:  1. Increase Protein intake (i.e. Lean meats, fish, eggs, legumes, and yogurt)  2. No added salt  3. If diabetic, follow a diabetic diet and check glucose prior to meals or as instructed by your physician.    Dietary Supplements(Take twice a day unless instructed otherwise):  [] Dev  [] 30ml ProStat [] EnsureEnlive [] Ensure Max/Premier [] Other:    Your nurse  is:  Emily     Electronically signed by Emily Guzmán RN on 2/20/2023 at 9:27 AM     Wound Care Center Information: Should you experience any significant changes in your wound(s) or have questions about your wound care, please contact the [facility] at [phone].   Hours of operation  Mon:  8AM - 2PM  Tue: 11AM - 5PM  Wed: CLOSED  Thur: 8AM - 4:30PM  Fri:  8AM - 4:30PM  The office is closed on all major holidays.    Please give us 24-48 business hours to return your call.  These hours of operation are subject to change. If you need help with your wounds and cannot wait until we are available, contact your PCP or go to your preferred emergency room.     Call your doctor now or seek immediate medical care if:    You have symptoms of infection, such as:  Increased pain, swelling, warmth, or redness.  Red streaks leading from the area.  Pus draining from the area.  A fever.         [] Patient unable to sign Discharge Instructions given to ECF/Transportation/POA

## 2023-02-20 NOTE — PROGRESS NOTES
[facility]  Progress Note and Procedure Note      Linda Ugarte  MEDICAL RECORD NUMBER:  [id number]  AGE: 71 y.o. GENDER: male  : 1950  EPISODE DATE:  2023      Subjective:     Chief Complaint   Patient presents with    Wound Check     Initial           HISTORY of PRESENT ILLNESS HPI     Linda Ugarte is a 71 y.o. male who presents today for wound evaluation. History of Wound Context: Patient is a self-referral for an ulcer overlying the right third toe. Patient states he noticed blood streaked socks after being on an exercise bike for short period of time as part of his rehab. Patient's had a longstanding history of generalized weakness and difficulty ambulating perhaps due to his Parkinson's. He describes pain when walking in his shoes overlying the ulcerated area of his toe. He is not a diabetic. He was recently prescribed a diuretic for his lower extremity edema.   Wound Pain Timing/Severity: moderate  Quality of pain: sharp  Severity:  3 / 10   Modifying Factors: None  Associated Signs/Symptoms: edema    Wound Identification:  Wound Type: pressure  Contributing Factors: edema and chronic pressure, obesity    Acute Wound: N/A not an acute wound        PAST MEDICAL HISTORY        Diagnosis Date    Class 1 obesity due to excess calories with serious comorbidity in adult 2022    Former cigarette smoker 2013    Hip pain     Hyperlipidemia     Lumbar radiculitis 2013    Parkinson's disease ([facility] [address]     Primary localized osteoarthrosis, pelvic region and thigh 2013    Subclinical hypothyroidism 2022       PAST SURGICAL HISTORY    Past Surgical History:   Procedure Laterality Date    EYE SURGERY Left 1959    eye trauma     JOINT REPLACEMENT Left     HIP REPLACEMENT    KNEE SURGERY Right 10yo       FAMILY HISTORY    Family History   Problem Relation Age of Onset    Dementia Mother 80    Emphysema Father     Alzheimer's Disease Brother SOCIAL HISTORY    Social History     Tobacco Use    Smoking status: Never    Smokeless tobacco: Never    Tobacco comments:     occ cigar   Vaping Use    Vaping Use: Never used   Substance Use Topics    Alcohol use: Yes     Alcohol/week: 42.0 standard drinks     Types: 42 Cans of beer per week     Comment: 2-4 beers per day       ALLERGIES    No Known Allergies    MEDICATIONS    Current Outpatient Medications on File Prior to Encounter   Medication Sig Dispense Refill    terbinafine (LAMISIL) 250 MG tablet Take 250 mg by mouth daily      atorvastatin (LIPITOR) 40 MG tablet Take 1 tablet by mouth nightly      carbidopa-levodopa (SINEMET)  MG per tablet Take 2 tablets by mouth in the morning, at noon, and at bedtime      carbidopa-levodopa (SINEMET)  MG per tablet Take 1 tablet by mouth in the morning, at noon, and at bedtime (Patient not taking: Reported on 2/20/2023)      triamterene-hydroCHLOROthiazide (MAXZIDE) 75-50 MG per tablet Take 0.5 tablets by mouth daily       No current facility-administered medications on file prior to encounter. REVIEW OF SYSTEMS    Constitutional: negative except for fatigue  Respiratory: negative for shortness of breath  Cardiovascular: negative except for lower extremity edema  Gastrointestinal: negative for abdominal pain  Musculoskeletal:negative except for muscle weakness      Last I4R if applicable:   Hemoglobin A1C   Date Value Ref Range Status   06/01/2022 5.2 See comment % Final     Comment:     Comment:  Diagnosis of Diabetes: > or = 6.5%  Increased risk of diabetes (Prediabetes): 5.7-6.4%  Glycemic Control: Nonpregnant Adults: <7.0%                    Pregnant: <6.0%           Objective: There were no vitals taken for this visit.     Wt Readings from Last 3 Encounters:   11/08/22 254 lb (115.2 kg)   06/01/22 254 lb 6.4 oz (115.4 kg)   05/02/22 255 lb (115.7 kg)       PHYSICAL EXAM    General Appearance: alert and oriented to person, place and time, well-developed and well-nourished, in no acute distress  Head: normocephalic and atraumatic  Pulmonary/Chest: clear to auscultation bilaterally- no wheezes, rales or rhonchi, normal air movement, no respiratory distress  Cardiovascular: normal rate, normal S1 and S2, no gallops, and no carotid bruits  Abdomen: soft, non-tender, non-distended, normal bowel sounds, no masses or organomegaly  Extremities: no cyanosis, no clubbing, 2 + edema-  right lower extremity and 1+ edema left lower extremity and full-thickness ulcer distal plantar right third toe with fibrin and minimal granulation tissue. Hyperkeratotic periwound  Palpable dorsalis pedis pulses bilaterally and Doppler signal bilateral posterior tibial arteries      Assessment:     1. Pressure ulcer of toe of right foot, stage 3 ([facility])          Procedure Note  Indications:  Based on my examination of this patient's wound(s) today, sharp excision is required to promote healing and evaluate the extent healing. Performed by: Reji Batista MD    Consent obtained: Yes    Time out taken:  Yes    Pain Control: Anesthetic  Anesthetic: 4% Lidocaine Liquid Topical       Debridement:Excisional Debridement    Using curette the wound(s) was/were sharply debrided down through and including the removal of epidermis, dermis, and subcutaneous tissue.         Devitalized Tissue Debrided:  fibrin    Pre Debridement Measurements:  Are located in the Wound Documentation Flow Sheet    Wound #: 1     Post  Debridement Measurements:  Wound 02/20/23 Toe (Comment  which one) Right;Plantar #1 THIRD TOE (Active)   Wound Image   02/20/23 0902   Wound Etiology Pressure Stage 3 02/20/23 0902   Wound Cleansed Cleansed with saline 02/20/23 0902   Dressing/Treatment Collagen with Ag 02/20/23 0926   Wound Length (cm) 0.5 cm 02/20/23 0902   Wound Width (cm) 0.8 cm 02/20/23 0902   Wound Depth (cm) 0.2 cm 02/20/23 0902   Wound Surface Area (cm^2) 0.4 cm^2 02/20/23 0902   Wound Volume (cm^3) 0.08 cm^3 02/20/23 0902   Post-Procedure Length (cm) 0.6 cm 02/20/23 0926   Post-Procedure Width (cm) 0.9 cm 02/20/23 0926   Post-Procedure Depth (cm) 0.4 cm 02/20/23 0926   Post-Procedure Surface Area (cm^2) 0.54 cm^2 02/20/23 0926   Post-Procedure Volume (cm^3) 0.216 cm^3 02/20/23 0926   Distance Tunneling (cm) 0 cm 02/20/23 0902   Undermining Maxium Distance (cm) 0 02/20/23 0902   Wound Assessment Fibrin;Pink/red 02/20/23 0902   Drainage Amount Small 02/20/23 0902   Drainage Description Serosanguinous 02/20/23 0902   Odor None 02/20/23 0902   Arianna-wound Assessment Hyperkeratosis (callous); Edematous; Blanchable erythema 02/20/23 0902   Margins Defined edges 02/20/23 0902   Number of days: 0          Percent of Wound Debrided: 100%    Total Surface Area Debrided:  .54 sq cm     Bleeding:  Minimal    Hemostasis Achieved:  by pressure    Procedural Pain:  0  / 10     Post Procedural Pain:  0 / 10     Response to treatment:  Well tolerated by patient. Plan: Will draw blood for inflammatory markers and CBC. Also will get x-ray of third toe. Will instruct patient on collagen primary dressing changes and we will refer patient to podiatry for further care of this pressure ulcer. Treatment Note please see attached Discharge Instructions    New Medication(s) at this visit:   New Prescriptions    No medications on file       Other orders at this visit:   Orders Placed This Encounter   Procedures    XR TOE RIGHT (MIN 2 VIEWS)    Sedimentation Rate    C-Reactive Protein    CBC       Weight Management: Yes but patient was not interested in Weight Management referral at this time. Smoking Cessation: Counseling given: Not Answered  Tobacco comments: occ cigar        Discharge [facility] Physician Orders and Discharge Instructions  [address] [address] [address] [address] [facility]. [facility]  [place]  Telephone: [phone]  NAME:  Spring Pierson  DATE OF BIRTH: 1950  MEDICAL RECORD NUMBER:  [id number]  DATE: 2/20/23     Return Appointment:  Return Appointment: With Tana Be DPM  or Shakila Dykes  in  1 [facility])  [] Return Appointment for a Wound Assessment with the nurse on:     No future appointments. [address]: none  Medically necessary services: [] Skilled Nursing [] PT (Eval & Treat) [] OT (Eval & Treat) [] Social Work [] Dietician  [] Other:    Wound care instructions: If you smoke we ask that you refrain from smoking. Smoking inhibits wounds from healing. When taking antibiotics take the entire prescription as ordered. Do not stop taking until medication is all gone unless otherwise instructed. Exercise as tolerated. Keep weight off wounds and reposition every 2 hours if applicable. Do not get wounds wet in the bath or shower unless otherwise instructed by your physician. If your wound is on your foot or leg, you may purchase a cast bag. Please ask at the pharmacy. Wash hands with soap and water prior to and after every dressing change. [x]Wash wounds with: 0.9% normal saline  [x]Arianna wound Topical Treatments: Do not apply lotions, creams, or ointments to the skin around the wound bed unless directed as followed:   Apply around the wound: Nothing           [x]Wound Location: right third toe   Apply Primary Dressing to wound: Felicia  Secondary Dressing: 2X2 gauze pad, Conforming roll gauze, and foam cut out   Avoid contact of tape with skin if possible. When to change Dressing: 3 times per week:Monday/Wednesday/Friday      [x] Edema Control:      Elevate leg(s) above the level of the heart for 30 minutes 4-5 times a day and/or when sitting. Avoid prolonged standing in one place.       [x]Pressure Relief: try to keep as much pressure off right third toe    [x]Off Loading(Pressure Reduction) When: Walking  Weight Bearing Status: No Weight bearing restrictions Right;  Offloading devices:  foam cut out      Dietary:  Important dietary reminders:  1. Increase Protein intake (i.e. Lean meats, fish, eggs, legumes, and yogurt)  2. No added salt  3. If diabetic, follow a diabetic diet and check glucose prior to meals or as instructed by your physician. Dietary Supplements(Take twice a day unless instructed otherwise):  [] Juancarlos Bamberger  [] 30ml ProStat [] Dilia Salmons [] Ensure Max/Premier [] Other:    Your nurse  is:  Héctor Nieto     Electronically signed by Ramandeep Sinclair RN on 2/20/2023 at 9:27 R Jessa Hobbs 8 Information: Should you experience any significant changes in your wound(s) or have questions about your wound care, please contact the [address] [place] [address] at [phone]. Hours of operation  Mon:  8AM - 2PM  Tue: 11AM - 5PM  Wed: CLOSED  Thur: 8AM - 4:30PM  Fri:  8AM - 4:30PM  The office is closed on all major holidays. Please give us 24-48 business hours to return your call. These hours of operation are subject to change. If you need help with your wounds and cannot wait until we are available, contact your PCP or go to your preferred emergency room. Call your doctor now or seek immediate medical care if:    You have symptoms of infection, such as: Increased pain, swelling, warmth, or redness. Red streaks leading from the area. Pus draining from the area. A fever.          [] Patient unable to sign Discharge Instructions given to ECF/Transportation/POA         Electronically signed by Xin Swanson MD on 2/20/2023 at 12:25 PM

## 2023-03-03 ENCOUNTER — HOSPITAL ENCOUNTER (OUTPATIENT)
Dept: WOUND CARE | Age: 73
Discharge: HOME | End: 2023-03-03
Payer: COMMERCIAL

## 2023-03-03 VITALS
HEART RATE: 80 BPM | RESPIRATION RATE: 16 BRPM | SYSTOLIC BLOOD PRESSURE: 171 MMHG | DIASTOLIC BLOOD PRESSURE: 90 MMHG | TEMPERATURE: 96.9 F

## 2023-03-03 DIAGNOSIS — L89.893 PRESSURE ULCER OF TOE OF RIGHT FOOT, STAGE 3 (HCC): Primary | ICD-10-CM

## 2023-03-03 PROCEDURE — 11043 DBRDMT MUSC&/FSCA 1ST 20/<: CPT

## 2023-03-03 PROCEDURE — 6370000000 HC RX 637 (ALT 250 FOR IP): Performed by: SPECIALIST

## 2023-03-03 PROCEDURE — 11721 DEBRIDE NAIL 6 OR MORE: CPT

## 2023-03-03 RX ORDER — LIDOCAINE HYDROCHLORIDE 20 MG/ML
JELLY TOPICAL ONCE
OUTPATIENT
Start: 2023-03-03 | End: 2023-03-03

## 2023-03-03 RX ORDER — LIDOCAINE 50 MG/G
OINTMENT TOPICAL ONCE
OUTPATIENT
Start: 2023-03-03 | End: 2023-03-03

## 2023-03-03 RX ORDER — GINSENG 100 MG
CAPSULE ORAL ONCE
OUTPATIENT
Start: 2023-03-03 | End: 2023-03-03

## 2023-03-03 RX ORDER — BACITRACIN, NEOMYCIN, POLYMYXIN B 400; 3.5; 5 [USP'U]/G; MG/G; [USP'U]/G
OINTMENT TOPICAL ONCE
OUTPATIENT
Start: 2023-03-03 | End: 2023-03-03

## 2023-03-03 RX ORDER — BACITRACIN ZINC AND POLYMYXIN B SULFATE 500; 1000 [USP'U]/G; [USP'U]/G
OINTMENT TOPICAL ONCE
OUTPATIENT
Start: 2023-03-03 | End: 2023-03-03

## 2023-03-03 RX ORDER — LIDOCAINE 40 MG/G
CREAM TOPICAL ONCE
OUTPATIENT
Start: 2023-03-03 | End: 2023-03-03

## 2023-03-03 RX ORDER — CLOBETASOL PROPIONATE 0.5 MG/G
OINTMENT TOPICAL ONCE
OUTPATIENT
Start: 2023-03-03 | End: 2023-03-03

## 2023-03-03 RX ORDER — LIDOCAINE HYDROCHLORIDE 40 MG/ML
SOLUTION TOPICAL ONCE
Status: COMPLETED | OUTPATIENT
Start: 2023-03-03 | End: 2023-03-03

## 2023-03-03 RX ORDER — BETAMETHASONE DIPROPIONATE 0.05 %
OINTMENT (GRAM) TOPICAL ONCE
OUTPATIENT
Start: 2023-03-03 | End: 2023-03-03

## 2023-03-03 RX ORDER — GENTAMICIN SULFATE 1 MG/G
OINTMENT TOPICAL ONCE
OUTPATIENT
Start: 2023-03-03 | End: 2023-03-03

## 2023-03-03 RX ORDER — LIDOCAINE HYDROCHLORIDE 40 MG/ML
SOLUTION TOPICAL ONCE
OUTPATIENT
Start: 2023-03-03 | End: 2023-03-03

## 2023-03-03 RX ADMIN — LIDOCAINE HYDROCHLORIDE: 40 SOLUTION TOPICAL at 13:11

## 2023-03-03 ASSESSMENT — PAIN SCALES - GENERAL: PAINLEVEL_OUTOF10: 4

## 2023-03-03 ASSESSMENT — PAIN DESCRIPTION - FREQUENCY: FREQUENCY: INTERMITTENT

## 2023-03-03 ASSESSMENT — PAIN DESCRIPTION - LOCATION: LOCATION: TOE (COMMENT WHICH ONE)

## 2023-03-03 ASSESSMENT — PAIN DESCRIPTION - PAIN TYPE: TYPE: ACUTE PAIN

## 2023-03-03 ASSESSMENT — PAIN DESCRIPTION - DESCRIPTORS: DESCRIPTORS: ACHING

## 2023-03-03 ASSESSMENT — PAIN DESCRIPTION - ORIENTATION: ORIENTATION: RIGHT

## 2023-03-03 NOTE — PROGRESS NOTES
1227 [facility]  Progress Note and Procedure Note      Adriana Gutiérrez  MEDICAL RECORD NUMBER:  [id number]  AGE: 71 y.o. GENDER: male  : 1950  EPISODE DATE:  3/3/2023    Subjective:     Chief Complaint   Patient presents with    Wound Check     Right third toe         HISTORY of PRESENT ILLNESS HPI     Adriana Gutiérrez is a 71 y.o. male who presents today for wound/ulcer evaluation. History of Wound Context: Follow up of RIGHT 3rd toe ulceration due to Parkinson's gate, participation in rehab. Lab work and xrays normal  Wound/Ulcer Pain Timing/Severity: none  Quality of pain: dull  Severity:  1 / 10   Modifying Factors: None  Associated Signs/Symptoms: edema    Ulcer Identification:  Ulcer Type: pressure    Contributing Factors: edema and chronic pressure    Acute Wound: Abrasion        PAST MEDICAL HISTORY        Diagnosis Date    Class 1 obesity due to excess calories with serious comorbidity in adult 2022    Former cigarette smoker 2013    Hip pain     Hyperlipidemia     Lumbar radiculitis 2013    Parkinson's disease ([facility] [address]     Primary localized osteoarthrosis, pelvic region and thigh 2013    Subclinical hypothyroidism 2022       PAST SURGICAL HISTORY    Past Surgical History:   Procedure Laterality Date    EYE SURGERY Left 1959    eye trauma     JOINT REPLACEMENT Left     HIP REPLACEMENT    KNEE SURGERY Right 8yo       FAMILY HISTORY    Family History   Problem Relation Age of Onset    Dementia Mother 80    Emphysema Father     Alzheimer's Disease Brother        SOCIAL HISTORY    Social History     Tobacco Use    Smoking status: Never    Smokeless tobacco: Never    Tobacco comments:     occ cigar   Vaping Use    Vaping Use: Never used   Substance Use Topics    Alcohol use:  Yes     Alcohol/week: 42.0 standard drinks     Types: 42 Cans of beer per week     Comment: 2-4 beers per day       ALLERGIES    No Known Allergies    MEDICATIONS    Current Outpatient Medications on File Prior to Encounter   Medication Sig Dispense Refill    atorvastatin (LIPITOR) 40 MG tablet Take 1 tablet by mouth nightly      carbidopa-levodopa (SINEMET)  MG per tablet Take 2 tablets by mouth in the morning, at noon, and at bedtime      carbidopa-levodopa (SINEMET)  MG per tablet Take 1 tablet by mouth in the morning, at noon, and at bedtime (Patient not taking: Reported on 2/20/2023)      triamterene-hydroCHLOROthiazide (MAXZIDE) 75-50 MG per tablet Take 0.5 tablets by mouth daily      terbinafine (LAMISIL) 250 MG tablet Take 250 mg by mouth daily       No current facility-administered medications on file prior to encounter. REVIEW OF SYSTEMS    Pertinent items are noted in HPI. Last T9Y if applicable:   Hemoglobin A1C   Date Value Ref Range Status   06/01/2022 5.2 See comment % Final     Comment:     Comment:  Diagnosis of Diabetes: > or = 6.5%  Increased risk of diabetes (Prediabetes): 5.7-6.4%  Glycemic Control: Nonpregnant Adults: <7.0%                    Pregnant: <6.0%           Objective:      BP (!) 171/90   Pulse 80   Temp 96.9 °F (36.1 °C) (Temporal)   Resp 16     Wt Readings from Last 3 Encounters:   11/08/22 254 lb (115.2 kg)   06/01/22 254 lb 6.4 oz (115.4 kg)   05/02/22 255 lb (115.7 kg)       PHYSICAL EXAM    General Appearance: alert and oriented to person, place and time, well-developed and well-nourished, in no acute distress      Assessment:      1. Pressure ulcer of toe of right foot, stage 3 ([address]           Procedure Note  Indications:  Based on my examination of this patient's wound(s)/ulcer(s) today, debridement is required to promote healing and evaluate the wound base. Performed by:  Jamin Roy DPM    Consent obtained:  Yes    Time out taken:  Yes    Pain Control: Anesthetic  Anesthetic: 4% Lidocaine Liquid Topical       Debridement: Excisional Debridement    Using #15 blade scalpel and scissors the wound(s)/ulcer(s) was/were debrided down through and including the removal of epidermis, dermis, and subcutaneous tissue,   MUSCLE    Devitalized Tissue Debrided:  fibrin, biofilm, slough, necrotic/eschar, exudate, and callus    Pre Debridement Measurements:  Are located in the [place]  Documentation Flow Sheet    Wound/Ulcer #: 1    Post Debridement Measurements:  Wound/Ulcer Descriptions are Pre Debridement except measurements:    Wound 02/20/23 Toe (Comment  which one) Right;Plantar #1 THIRD TOE (Active)   Wound Image   03/03/23 1312   Wound Etiology Pressure Stage 3 02/20/23 0902   Wound Cleansed Cleansed with saline 03/03/23 1312   Dressing/Treatment Collagen with Ag 03/03/23 1332   Wound Length (cm) 0.3 cm 03/03/23 1312   Wound Width (cm) 0.3 cm 03/03/23 1312   Wound Depth (cm) 0.2 cm 03/03/23 1312   Wound Surface Area (cm^2) 0.09 cm^2 03/03/23 1312   Change in Wound Size % (l*w) 77.5 03/03/23 1312   Wound Volume (cm^3) 0.018 cm^3 03/03/23 1312   Wound Healing % 78 03/03/23 1312   Post-Procedure Length (cm) 0.4 cm 03/03/23 1329   Post-Procedure Width (cm) 0.4 cm 03/03/23 1329   Post-Procedure Depth (cm) 0.4 cm 03/03/23 1329   Post-Procedure Surface Area (cm^2) 0.16 cm^2 03/03/23 1329   Post-Procedure Volume (cm^3) 0.064 cm^3 03/03/23 1329   Distance Tunneling (cm) 0 cm 02/20/23 0902   Undermining Maxium Distance (cm) 0 02/20/23 0902   Wound Assessment Fibrin;Pink/red 03/03/23 1312   Drainage Amount Small 03/03/23 1312   Drainage Description Yellow 03/03/23 1312   Odor Mild 03/03/23 1312   Arianna-wound Assessment Hyperkeratosis (callous); Edematous; Blanchable erythema 03/03/23 1312   Margins Defined edges 03/03/23 1312   Number of days: 11          Percent of Wound(s)/Ulcer(s) Debrided: 100%    Total Surface Area Debrided:  0.16 sq cm     Diabetic/Pressure/Non Pressure Ulcers only:  Ulcer: Pressure ulcer, Stage 3     Estimated Blood Loss:  Minimal    Hemostasis Achieved:  by pressure    Procedural Pain:  1  / 10     Post Procedural Pain: 1 / 10     Response to treatment:  Well tolerated by patient. Plan:   Per patient request, nail debridement of 10 pedal nails in both thickness and length. Debridement with off-loading dressing and further promotion of granulation thru use of Felicia    Treatment Note please see attached Discharge Instructions    New Medication(s) at this visit:   New Prescriptions    No medications on file       Other orders at this visit:   Orders Placed This Encounter   Procedures    Initiate Outpatient Wound Care Protocol       Smoking Cessation: Counseling given: Not Answered  Tobacco comments: occ cigar      Written patient dismissal instructions given to patient and signed by patient or POA. Discharge [facility] Physician Orders and Discharge Instructions  [address] [address] [address] [address] [facility]. Home. [place]  Telephone: [phone]  NAME:  Pato Burns OF BIRTH:  1950  MEDICAL RECORD NUMBER:  [id number]  DATE: 3/3/23     Return Appointment:  Return Appointment: With Mallorie Reece DPM  in  1 [facility])  [] Return Appointment for a Wound Assessment with the nurse on:     No future appointments. [address]: none  Medically necessary services: [] Skilled Nursing [] PT (Eval & Treat) [] OT (Eval & Treat) [] Social Work [] Dietician  [] Other:    Wound care instructions: If you smoke we ask that you refrain from smoking. Smoking inhibits wounds from healing. When taking antibiotics take the entire prescription as ordered. Do not stop taking until medication is all gone unless otherwise instructed. Exercise as tolerated. Keep weight off wounds and reposition every 2 hours if applicable. Do not get wounds wet in the bath or shower unless otherwise instructed by your physician. If your wound is on your foot or leg, you may purchase a cast bag. Please ask at the pharmacy.   Wash hands with soap and water prior to and after every dressing change. [x]Wash wounds with: 0.9% normal saline  [x]Arianna wound Topical Treatments: Do not apply lotions, creams, or ointments to the skin around the wound bed unless directed as followed:   Apply around the wound: No-Sting barrier film         [x]Wound Location: right third toe   Apply Primary Dressing to wound: Felicia  Secondary Dressing: 2X2 gauze pad, Conforming roll gauze, and / or bandaid   Avoid contact of tape with skin if possible. When to change Dressing: Every other day    Make sure that tubing is not against the skin by using gauze and/or Kerlix. [x] Edema Control:      Elevate leg(s) above the level of the heart for 30 minutes 4-5 times a day and/or when sitting. Avoid prolonged standing in one place. [x]Off Loading(Pressure Reduction) When: Walking  Weight Bearing Status: Partial Weight bearing Right:  Heel;  Offloading devices:  foam cutout      Dietary:  Important dietary reminders:  1. Increase Protein intake (i.e. Lean meats, fish, eggs, legumes, and yogurt)  2. No added salt  3. If diabetic, follow a diabetic diet and check glucose prior to meals or as instructed by your physician. Dietary Supplements(Take twice a day unless instructed otherwise):  [] Zada Quivers  [] 30ml ProStat [] [place] Danielle [] Ensure Max/Premier [] Other:    Your nurse  is:  Anabel Chiang     Electronically signed by See Mcgovern RN on 3/3/2023 at 1:28 PM     [facility] Information: Should you experience any significant changes in your wound(s) or have questions about your wound care, please contact the [address] [place] [address] at [phone]. Hours of operation  Mon:  8AM - 2PM  Tue: 11AM - 5PM  Wed: CLOSED  Thur: 8AM - 4:30PM  Fri:  8AM - 4:30PM  The office is closed on all major holidays. Please give us 24-48 business hours to return your call. These hours of operation are subject to change.  If you need help with your wounds and cannot wait until we are available, contact your PCP or go to your preferred emergency room. Call your doctor now or seek immediate medical care if:    You have symptoms of infection, such as: Increased pain, swelling, warmth, or redness. Red streaks leading from the area. Pus draining from the area. A fever.          [] Patient unable to sign Discharge Instructions given to ECF/Transportation/POA              Electronically signed by Merry Simmons DPM on 3/3/2023 at 1:39 PM

## 2023-03-03 NOTE — DISCHARGE INSTRUCTIONS
40[address] [facility] Physician Orders and Discharge Instructions  [address] [address] [address] [address] [facility]. [facility] [place]  Telephone: [phone]  NAME:  Carin Enrique OF BIRTH:  1950  MEDICAL RECORD NUMBER:  [id number]  DATE: 3/3/23     Return Appointment:  Return Appointment: With Andrew Yee DPM  in  1 [facility])  [] Return Appointment for a Wound Assessment with the nurse on:     No future appointments. [address]: none  Medically necessary services: [] Skilled Nursing [] PT (Eval & Treat) [] OT (Eval & Treat) [] Social Work [] Dietician  [] Other:    Wound care instructions: If you smoke we ask that you refrain from smoking. Smoking inhibits wounds from healing. When taking antibiotics take the entire prescription as ordered. Do not stop taking until medication is all gone unless otherwise instructed. Exercise as tolerated. Keep weight off wounds and reposition every 2 hours if applicable. Do not get wounds wet in the bath or shower unless otherwise instructed by your physician. If your wound is on your foot or leg, you may purchase a cast bag. Please ask at the pharmacy. Wash hands with soap and water prior to and after every dressing change. [x]Wash wounds with: 0.9% normal saline  [x]Arianna wound Topical Treatments: Do not apply lotions, creams, or ointments to the skin around the wound bed unless directed as followed:   Apply around the wound: No-Sting barrier film         [x]Wound Location: right third toe   Apply Primary Dressing to wound: Felicia  Secondary Dressing: 2X2 gauze pad, Conforming roll gauze, and / or bandaid   Avoid contact of tape with skin if possible. When to change Dressing: Every other day    Make sure that tubing is not against the skin by using gauze and/or Kerlix. [x] Edema Control:      Elevate leg(s) above the level of the heart for 30 minutes 4-5 times a day and/or when sitting.   Avoid prolonged standing in one place. [x]Off Loading(Pressure Reduction) When: Walking  Weight Bearing Status: Partial Weight bearing Right:  Heel;  Offloading devices:  foam cutout      Dietary:  Important dietary reminders:  1. Increase Protein intake (i.e. Lean meats, fish, eggs, legumes, and yogurt)  2. No added salt  3. If diabetic, follow a diabetic diet and check glucose prior to meals or as instructed by your physician. Dietary Supplements(Take twice a day unless instructed otherwise):  [] Mana Norcross  [] 30ml ProStat [] Aj Polly [] Ensure Max/Premier [] Other:    Your nurse  is:  Radha Jackson     Electronically signed by Paul Parson RN on 3/3/2023 at 1:28 PM     [facility] Information: Should you experience any significant changes in your wound(s) or have questions about your wound care, please contact the [address] [place] [address] at [phone]. Hours of operation  Mon:  8AM - 2PM  Tue: 11AM - 5PM  Wed: CLOSED  Thur: 8AM - 4:30PM  Fri:  8AM - 4:30PM  The office is closed on all major holidays. Please give us 24-48 business hours to return your call. These hours of operation are subject to change. If you need help with your wounds and cannot wait until we are available, contact your PCP or go to your preferred emergency room. Call your doctor now or seek immediate medical care if:    You have symptoms of infection, such as: Increased pain, swelling, warmth, or redness. Red streaks leading from the area. Pus draining from the area. A fever.          [] Patient unable to sign Discharge Instructions given to ECF/Transportation/POA

## 2023-03-16 ENCOUNTER — HOSPITAL ENCOUNTER (OUTPATIENT)
Dept: WOUND CARE | Age: 73
Discharge: HOME | End: 2023-03-16
Payer: COMMERCIAL

## 2023-03-16 VITALS
TEMPERATURE: 97.5 F | DIASTOLIC BLOOD PRESSURE: 85 MMHG | SYSTOLIC BLOOD PRESSURE: 165 MMHG | RESPIRATION RATE: 18 BRPM | HEART RATE: 86 BPM

## 2023-03-16 PROCEDURE — 99212 OFFICE O/P EST SF 10 MIN: CPT

## 2023-03-16 NOTE — PROGRESS NOTES
1227 [facility]  Progress Note and Procedure Note      Yared Conrad  MEDICAL RECORD NUMBER:  [id number]  AGE: 71 y.o. GENDER: male  : 1950  EPISODE DATE:  3/16/2023    Subjective:     Chief Complaint   Patient presents with    Wound Check     Right foot         HISTORY of PRESENT ILLNESS HPI     Yared Conrad is a 71 y.o. male who presents today for wound/ulcer evaluation. History of Wound Context: Follow up of RIGHT 3rd toe ulceration due to Parkinson's gate, participation in rehab. Wound/Ulcer Pain Timing/Severity: none  Quality of pain: dull  Severity:  1 / 10   Modifying Factors: None  Associated Signs/Symptoms: edema    Ulcer Identification:  Ulcer Type: pressure    Contributing Factors: edema and chronic pressure    Acute Wound: Abrasion        PAST MEDICAL HISTORY        Diagnosis Date    Class 1 obesity due to excess calories with serious comorbidity in adult 2022    Former cigarette smoker 2013    Hip pain     Hyperlipidemia     Lumbar radiculitis 2013    Parkinson's disease ([facility] [address]     Primary localized osteoarthrosis, pelvic region and thigh 2013    Subclinical hypothyroidism 2022       PAST SURGICAL HISTORY    Past Surgical History:   Procedure Laterality Date    EYE SURGERY Left 1959    eye trauma     JOINT REPLACEMENT Left     HIP REPLACEMENT    KNEE SURGERY Right 10yo       FAMILY HISTORY    Family History   Problem Relation Age of Onset    Dementia Mother 80    Emphysema Father     Alzheimer's Disease Brother        SOCIAL HISTORY    Social History     Tobacco Use    Smoking status: Never    Smokeless tobacco: Never    Tobacco comments:     occ cigar   Vaping Use    Vaping Use: Never used   Substance Use Topics    Alcohol use:  Yes     Alcohol/week: 42.0 standard drinks     Types: 42 Cans of beer per week     Comment: 2-4 beers per day       ALLERGIES    No Known Allergies    MEDICATIONS    Current Outpatient Medications on File Prior to Encounter   Medication Sig Dispense Refill    atorvastatin (LIPITOR) 40 MG tablet Take 1 tablet by mouth nightly      carbidopa-levodopa (SINEMET)  MG per tablet Take 2 tablets by mouth in the morning, at noon, and at bedtime      triamterene-hydroCHLOROthiazide (MAXZIDE) 75-50 MG per tablet Take 0.5 tablets by mouth daily      terbinafine (LAMISIL) 250 MG tablet Take 250 mg by mouth daily       No current facility-administered medications on file prior to encounter. REVIEW OF SYSTEMS    Pertinent items are noted in HPI. Last U2H if applicable:   Hemoglobin A1C   Date Value Ref Range Status   06/01/2022 5.2 See comment % Final     Comment:     Comment:  Diagnosis of Diabetes: > or = 6.5%  Increased risk of diabetes (Prediabetes): 5.7-6.4%  Glycemic Control: Nonpregnant Adults: <7.0%                    Pregnant: <6.0%           Objective:      BP (!) 165/85   Pulse 86   Temp 97.5 °F (36.4 °C) (Temporal)   Resp 18     Wt Readings from Last 3 Encounters:   11/08/22 254 lb (115.2 kg)   06/01/22 254 lb 6.4 oz (115.4 kg)   05/02/22 255 lb (115.7 kg)       PHYSICAL EXAM    General Appearance: alert and oriented to person, place and time, well-developed and well-nourished, in no acute distress  Right 3rd toe is healed with no signs of infection. There is hyperkeratosis noted at the previous ulcer site. Assessment:      Ulcer right 3rd toe. Procedure Note  Indications:  Based on my examination of this patient's wound(s)/ulcer(s) today, debridement is not required to promote healing and evaluate the wound base. Plan:     Treatment Note please see attached Discharge Instructions. Applied No Sting and debrided callus. Instructed patient to obtain a silicon toe cap for his right 3rd toe. Patient discharged from wound care. Return prn.     New Medication(s) at this visit:   New Prescriptions    No medications on file       Other orders at this visit:   No orders of the defined types were placed in this encounter. Smoking Cessation: Counseling given: Not Answered  Tobacco comments: occ cigar      Written patient dismissal instructions given to patient and signed by patient or POA. Discharge Instructions         [address] [facility] [address] [address] [address] [address] [facility]. Home. [place]  Telephone: [phone]    NAME:  Sarabjit Falk OF BIRTH:  1950  MEDICAL RECORD NUMBER:  [id number]  DATE:  3/16/2023      Congratulations!! You have completed your treatment. Return to your Primary Care Physician for all your health issues. Resume your ordinary activities as tolerated. Take your medications as prescribed by your primary care physician. Check your skin daily for cracks, bruises, sores, or dryness. Use a moisturizer as needed. Clean and dry your skin, using mild soap and warm water (not hot). Avoid alcohol and caffeine and do not smoke. Maintain a nutritious diet. Ask your primary care doctor about adding a multivitamin to your medication regimen. Avoid pressure on your healed wound site. Additional instructions for healed wound/skin care: Use  medium silipos toe cap while wearing shoes. Remove when shoes are off. THANK YOU FOR ALLOWING US TO SERVE YOU.  PLEASE CALL IF YOU DEVELOP ANOTHER WOUND [phone]    [] Patient unable to sign Discharge Instructions given to ECF/Transportation/POA    Electronically signed by Mino Montez RN on 3/16/2023 at 4:21 PM          Electronically signed by Dwane Castleman, DPM on 3/16/2023 at 4:25 PM

## 2023-03-16 NOTE — DISCHARGE INSTRUCTIONS
DISCHARGE INSTRUCTIONS  Wound Clinic Physician Orders   [facility]   [address]  Telephone: [phone] FAX [phone]    NAME:  Geoff Wayne  YOB: 1950  MEDICAL RECORD NUMBER:  [id number]  DATE:  3/16/2023      Congratulations!! You have completed your treatment.   Return to your Primary Care Physician for all your health issues.   Resume your ordinary activities as tolerated.   Take your medications as prescribed by your primary care physician.   Check your skin daily for cracks, bruises, sores, or dryness. Use a moisturizer as needed.   Clean and dry your skin, using mild soap and warm water (not hot).   Avoid alcohol and caffeine and do not smoke.   Maintain a nutritious diet. Ask your primary care doctor about adding a multivitamin to your medication regimen.   Avoid pressure on your healed wound site.     Additional instructions for healed wound/skin care: Use  medium silipos toe cap while wearing shoes. Remove when shoes are off.       THANK YOU FOR ALLOWING US TO SERVE YOU. PLEASE CALL IF YOU DEVELOP ANOTHER WOUND [phone]    [] Patient unable to sign Discharge Instructions given to ECF/Transportation/POA    Electronically signed by Ashley Walsh RN on 3/16/2023 at 4:21 PM

## 2023-03-28 ENCOUNTER — HOSPITAL ENCOUNTER (INPATIENT)
Age: 73
LOS: 6 days | Discharge: HOME HEALTH | DRG: 566 | End: 2023-04-03
Attending: EMERGENCY MEDICINE | Admitting: INTERNAL MEDICINE
Payer: COMMERCIAL

## 2023-03-28 DIAGNOSIS — T79.6XXA TRAUMATIC RHABDOMYOLYSIS, INITIAL ENCOUNTER (HCC): Primary | ICD-10-CM

## 2023-03-28 PROBLEM — G20.A1 PARKINSON'S DISEASE: Status: ACTIVE | Noted: 2023-03-28

## 2023-03-28 PROBLEM — G20 PARKINSON'S DISEASE (HCC): Status: ACTIVE | Noted: 2023-03-28

## 2023-03-28 LAB
ALBUMIN SERPL-MCNC: 4 G/DL (ref 3.4–5)
ALBUMIN/GLOB SERPL: 1.3 {RATIO} (ref 1.1–2.2)
ALP SERPL-CCNC: 83 U/L (ref 40–129)
ALT SERPL-CCNC: 23 U/L (ref 10–40)
AMORPH SED URNS QL MICRO: ABNORMAL /HPF
ANION GAP SERPL CALCULATED.3IONS-SCNC: 13 MMOL/L (ref 3–16)
AST SERPL-CCNC: 611 U/L (ref 15–37)
BACTERIA URNS QL MICRO: ABNORMAL /HPF
BASOPHILS # BLD: 0 K/UL (ref 0–0.2)
BASOPHILS NFR BLD: 0.4 %
BILIRUB SERPL-MCNC: 0.6 MG/DL (ref 0–1)
BILIRUB UR QL STRIP.AUTO: ABNORMAL
BUN SERPL-MCNC: 24 MG/DL (ref 7–20)
CALCIUM SERPL-MCNC: 8.7 MG/DL (ref 8.3–10.6)
CASTS #/AREA URNS LPF: ABNORMAL /LPF
CHLORIDE SERPL-SCNC: 99 MMOL/L (ref 99–110)
CK SERPL-CCNC: 3575 U/L (ref 39–308)
CLARITY UR: CLEAR
CO2 SERPL-SCNC: 27 MMOL/L (ref 21–32)
COARSE GRAN CASTS #/AREA URNS LPF: ABNORMAL /LPF (ref 0–2)
COLOR UR: ABNORMAL
CREAT SERPL-MCNC: 1.2 MG/DL (ref 0.8–1.3)
DEPRECATED RDW RBC AUTO: 12.9 % (ref 12.4–15.4)
EOSINOPHIL # BLD: 0 K/UL (ref 0–0.6)
EOSINOPHIL NFR BLD: 0 %
GFR SERPLBLD CREATININE-BSD FMLA CKD-EPI: >60 ML/MIN/{1.73_M2}
GLUCOSE SERPL-MCNC: 109 MG/DL (ref 70–99)
GLUCOSE UR STRIP.AUTO-MCNC: NEGATIVE MG/DL
HCT VFR BLD AUTO: 44.8 % (ref 40.5–52.5)
HGB BLD-MCNC: 15.6 G/DL (ref 13.5–17.5)
HGB UR QL STRIP.AUTO: ABNORMAL
HYALINE CASTS #/AREA URNS LPF: ABNORMAL /LPF (ref 0–2)
KETONES UR STRIP.AUTO-MCNC: ABNORMAL MG/DL
LEUKOCYTE ESTERASE UR QL STRIP.AUTO: ABNORMAL
LYMPHOCYTES # BLD: 0.8 K/UL (ref 1–5.1)
LYMPHOCYTES NFR BLD: 14.8 %
MCH RBC QN AUTO: 30.7 PG (ref 26–34)
MCHC RBC AUTO-ENTMCNC: 34.8 G/DL (ref 31–36)
MCV RBC AUTO: 88.3 FL (ref 80–100)
MONOCYTES # BLD: 1.1 K/UL (ref 0–1.3)
MONOCYTES NFR BLD: 21.9 %
MUCOUS THREADS #/AREA URNS LPF: ABNORMAL /LPF
NEUTROPHILS # BLD: 3.3 K/UL (ref 1.7–7.7)
NEUTROPHILS NFR BLD: 62.9 %
NITRITE UR QL STRIP.AUTO: NEGATIVE
PH UR STRIP.AUTO: 5.5 [PH] (ref 5–8)
PLATELET # BLD AUTO: 121 K/UL (ref 135–450)
PMV BLD AUTO: 7.7 FL (ref 5–10.5)
POTASSIUM SERPL-SCNC: 3.6 MMOL/L (ref 3.5–5.1)
PROT SERPL-MCNC: 7 G/DL (ref 6.4–8.2)
PROT UR STRIP.AUTO-MCNC: 100 MG/DL
RBC # BLD AUTO: 5.07 M/UL (ref 4.2–5.9)
RBC #/AREA URNS HPF: ABNORMAL /HPF (ref 0–4)
SODIUM SERPL-SCNC: 139 MMOL/L (ref 136–145)
SP GR UR STRIP.AUTO: >=1.03 (ref 1–1.03)
UA DIPSTICK W REFLEX MICRO PNL UR: YES
URN SPEC COLLECT METH UR: ABNORMAL
UROBILINOGEN UR STRIP-ACNC: 1 E.U./DL
WBC # BLD AUTO: 5.2 K/UL (ref 4–11)
WBC #/AREA URNS HPF: ABNORMAL /HPF (ref 0–5)

## 2023-03-28 PROCEDURE — 2580000003 HC RX 258: Performed by: NURSE PRACTITIONER

## 2023-03-28 PROCEDURE — 82550 ASSAY OF CK (CPK): CPT

## 2023-03-28 PROCEDURE — 99285 EMERGENCY DEPT VISIT HI MDM: CPT

## 2023-03-28 PROCEDURE — 80053 COMPREHEN METABOLIC PANEL: CPT

## 2023-03-28 PROCEDURE — 81001 URINALYSIS AUTO W/SCOPE: CPT

## 2023-03-28 PROCEDURE — 96361 HYDRATE IV INFUSION ADD-ON: CPT

## 2023-03-28 PROCEDURE — 1200000000 HC SEMI PRIVATE

## 2023-03-28 PROCEDURE — 96360 HYDRATION IV INFUSION INIT: CPT

## 2023-03-28 PROCEDURE — 85025 COMPLETE CBC W/AUTO DIFF WBC: CPT

## 2023-03-28 RX ORDER — LISINOPRIL AND HYDROCHLOROTHIAZIDE 25; 20 MG/1; MG/1
1 TABLET ORAL DAILY
COMMUNITY

## 2023-03-28 RX ORDER — POLYETHYLENE GLYCOL 3350 17 G/17G
17 POWDER, FOR SOLUTION ORAL DAILY PRN
Status: DISCONTINUED | OUTPATIENT
Start: 2023-03-28 | End: 2023-04-03 | Stop reason: HOSPADM

## 2023-03-28 RX ORDER — SODIUM CHLORIDE 0.9 % (FLUSH) 0.9 %
5-40 SYRINGE (ML) INJECTION EVERY 12 HOURS SCHEDULED
Status: DISCONTINUED | OUTPATIENT
Start: 2023-03-28 | End: 2023-04-03 | Stop reason: HOSPADM

## 2023-03-28 RX ORDER — SODIUM CHLORIDE 0.9 % (FLUSH) 0.9 %
5-40 SYRINGE (ML) INJECTION PRN
Status: DISCONTINUED | OUTPATIENT
Start: 2023-03-28 | End: 2023-04-03 | Stop reason: HOSPADM

## 2023-03-28 RX ORDER — TRIAMTERENE AND HYDROCHLOROTHIAZIDE 75; 50 MG/1; MG/1
0.5 TABLET ORAL DAILY
Status: DISCONTINUED | OUTPATIENT
Start: 2023-03-29 | End: 2023-03-28

## 2023-03-28 RX ORDER — SODIUM CHLORIDE 9 MG/ML
INJECTION, SOLUTION INTRAVENOUS PRN
Status: DISCONTINUED | OUTPATIENT
Start: 2023-03-28 | End: 2023-04-03 | Stop reason: HOSPADM

## 2023-03-28 RX ORDER — ACETAMINOPHEN 325 MG/1
650 TABLET ORAL EVERY 6 HOURS PRN
Status: DISCONTINUED | OUTPATIENT
Start: 2023-03-28 | End: 2023-04-03 | Stop reason: HOSPADM

## 2023-03-28 RX ORDER — ENOXAPARIN SODIUM 100 MG/ML
30 INJECTION SUBCUTANEOUS 2 TIMES DAILY
Status: DISCONTINUED | OUTPATIENT
Start: 2023-03-29 | End: 2023-04-03 | Stop reason: HOSPADM

## 2023-03-28 RX ORDER — SODIUM CHLORIDE 9 MG/ML
INJECTION, SOLUTION INTRAVENOUS CONTINUOUS
Status: DISCONTINUED | OUTPATIENT
Start: 2023-03-28 | End: 2023-04-03 | Stop reason: HOSPADM

## 2023-03-28 RX ORDER — ACETAMINOPHEN 650 MG/1
650 SUPPOSITORY RECTAL EVERY 6 HOURS PRN
Status: DISCONTINUED | OUTPATIENT
Start: 2023-03-28 | End: 2023-04-03 | Stop reason: HOSPADM

## 2023-03-28 RX ORDER — HYDROCHLOROTHIAZIDE 25 MG/1
25 TABLET ORAL DAILY
Status: DISCONTINUED | OUTPATIENT
Start: 2023-03-29 | End: 2023-04-03 | Stop reason: HOSPADM

## 2023-03-28 RX ORDER — ONDANSETRON 4 MG/1
4 TABLET, ORALLY DISINTEGRATING ORAL EVERY 8 HOURS PRN
Status: DISCONTINUED | OUTPATIENT
Start: 2023-03-28 | End: 2023-04-03 | Stop reason: HOSPADM

## 2023-03-28 RX ORDER — LISINOPRIL 20 MG/1
20 TABLET ORAL DAILY
Status: DISCONTINUED | OUTPATIENT
Start: 2023-03-29 | End: 2023-04-03 | Stop reason: HOSPADM

## 2023-03-28 RX ORDER — 0.9 % SODIUM CHLORIDE 0.9 %
1000 INTRAVENOUS SOLUTION INTRAVENOUS ONCE
Status: COMPLETED | OUTPATIENT
Start: 2023-03-28 | End: 2023-03-28

## 2023-03-28 RX ORDER — ONDANSETRON 2 MG/ML
4 INJECTION INTRAMUSCULAR; INTRAVENOUS EVERY 6 HOURS PRN
Status: DISCONTINUED | OUTPATIENT
Start: 2023-03-28 | End: 2023-04-03 | Stop reason: HOSPADM

## 2023-03-28 RX ADMIN — SODIUM CHLORIDE 1000 ML: 9 INJECTION, SOLUTION INTRAVENOUS at 18:50

## 2023-03-28 RX ADMIN — SODIUM CHLORIDE: 9 INJECTION, SOLUTION INTRAVENOUS at 23:47

## 2023-03-28 RX ADMIN — SODIUM CHLORIDE, PRESERVATIVE FREE 5 ML: 5 INJECTION INTRAVENOUS at 23:50

## 2023-03-28 ASSESSMENT — PAIN - FUNCTIONAL ASSESSMENT: PAIN_FUNCTIONAL_ASSESSMENT: NONE - DENIES PAIN

## 2023-03-28 ASSESSMENT — PAIN SCALES - GENERAL: PAINLEVEL_OUTOF10: 0

## 2023-03-29 LAB
ANION GAP SERPL CALCULATED.3IONS-SCNC: 9 MMOL/L (ref 3–16)
BASOPHILS # BLD: 0 K/UL (ref 0–0.2)
BASOPHILS NFR BLD: 0.7 %
BILIRUB DIRECT SERPL-MCNC: <0.2 MG/DL (ref 0–0.3)
BILIRUB INDIRECT SERPL-MCNC: NORMAL MG/DL (ref 0–1)
BUN SERPL-MCNC: 19 MG/DL (ref 7–20)
CALCIUM SERPL-MCNC: 8 MG/DL (ref 8.3–10.6)
CHLORIDE SERPL-SCNC: 103 MMOL/L (ref 99–110)
CK SERPL-CCNC: 3625 U/L (ref 39–308)
CO2 SERPL-SCNC: 28 MMOL/L (ref 21–32)
CREAT SERPL-MCNC: 1.1 MG/DL (ref 0.8–1.3)
DEPRECATED RDW RBC AUTO: 13 % (ref 12.4–15.4)
EOSINOPHIL # BLD: 0 K/UL (ref 0–0.6)
EOSINOPHIL NFR BLD: 0 %
GFR SERPLBLD CREATININE-BSD FMLA CKD-EPI: >60 ML/MIN/{1.73_M2}
GLUCOSE SERPL-MCNC: 122 MG/DL (ref 70–99)
HCT VFR BLD AUTO: 41.2 % (ref 40.5–52.5)
HGB BLD-MCNC: 14.2 G/DL (ref 13.5–17.5)
LYMPHOCYTES # BLD: 0.8 K/UL (ref 1–5.1)
LYMPHOCYTES NFR BLD: 24.2 %
MCH RBC QN AUTO: 30.3 PG (ref 26–34)
MCHC RBC AUTO-ENTMCNC: 34.3 G/DL (ref 31–36)
MCV RBC AUTO: 88.3 FL (ref 80–100)
MONOCYTES # BLD: 0.8 K/UL (ref 0–1.3)
MONOCYTES NFR BLD: 22.9 %
NEUTROPHILS # BLD: 1.8 K/UL (ref 1.7–7.7)
NEUTROPHILS NFR BLD: 52.2 %
PLATELET # BLD AUTO: 113 K/UL (ref 135–450)
PMV BLD AUTO: 7.8 FL (ref 5–10.5)
POTASSIUM SERPL-SCNC: 3.7 MMOL/L (ref 3.5–5.1)
RBC # BLD AUTO: 4.67 M/UL (ref 4.2–5.9)
SODIUM SERPL-SCNC: 140 MMOL/L (ref 136–145)
WBC # BLD AUTO: 3.5 K/UL (ref 4–11)

## 2023-03-29 PROCEDURE — 97535 SELF CARE MNGMENT TRAINING: CPT

## 2023-03-29 PROCEDURE — 97166 OT EVAL MOD COMPLEX 45 MIN: CPT

## 2023-03-29 PROCEDURE — 85025 COMPLETE CBC W/AUTO DIFF WBC: CPT

## 2023-03-29 PROCEDURE — 97116 GAIT TRAINING THERAPY: CPT

## 2023-03-29 PROCEDURE — 97530 THERAPEUTIC ACTIVITIES: CPT

## 2023-03-29 PROCEDURE — 97162 PT EVAL MOD COMPLEX 30 MIN: CPT

## 2023-03-29 PROCEDURE — 36415 COLL VENOUS BLD VENIPUNCTURE: CPT

## 2023-03-29 PROCEDURE — 80048 BASIC METABOLIC PNL TOTAL CA: CPT

## 2023-03-29 PROCEDURE — 6360000002 HC RX W HCPCS: Performed by: NURSE PRACTITIONER

## 2023-03-29 PROCEDURE — 1200000000 HC SEMI PRIVATE

## 2023-03-29 PROCEDURE — 6370000000 HC RX 637 (ALT 250 FOR IP): Performed by: NURSE PRACTITIONER

## 2023-03-29 PROCEDURE — 82550 ASSAY OF CK (CPK): CPT

## 2023-03-29 RX ADMIN — ENOXAPARIN SODIUM 30 MG: 100 INJECTION SUBCUTANEOUS at 22:09

## 2023-03-29 RX ADMIN — HYDROCHLOROTHIAZIDE 25 MG: 25 TABLET ORAL at 08:58

## 2023-03-29 RX ADMIN — LISINOPRIL 20 MG: 20 TABLET ORAL at 08:58

## 2023-03-29 RX ADMIN — CARBIDOPA AND LEVODOPA 2 TABLET: 25; 100 TABLET ORAL at 15:40

## 2023-03-29 RX ADMIN — CARBIDOPA AND LEVODOPA 2 TABLET: 25; 100 TABLET ORAL at 00:20

## 2023-03-29 RX ADMIN — ENOXAPARIN SODIUM 30 MG: 100 INJECTION SUBCUTANEOUS at 08:58

## 2023-03-29 RX ADMIN — CARBIDOPA AND LEVODOPA 2 TABLET: 25; 100 TABLET ORAL at 08:58

## 2023-03-29 RX ADMIN — CARBIDOPA AND LEVODOPA 2 TABLET: 25; 100 TABLET ORAL at 22:09

## 2023-03-29 NOTE — ED NOTES
Patient provided with a turkey sandwich, pudding, and pretzels.       Tone Perera, FARHEEN  03/28/23 2454

## 2023-03-29 NOTE — CARE COORDINATION
Case Management Assessment  Initial Evaluation    Date/Time of Evaluation: 3/29/2023 2:40 PM  Assessment Completed by: Tawny Peñaloza RN    If patient is discharged prior to next notation, then this note serves as note for discharge by case management. Patient Name: Maddie Bird                   YOB: 1950  Diagnosis: Traumatic rhabdomyolysis, [facility] Macie Pedroza. 6XXA]                   Date / Time: 3/28/2023  5:22 PM    Patient Admission Status: Inpatient   Readmission Risk (Low < 19, Mod (19-27), High > 27): Readmission Risk Score: 7    Current PCP: Jorge Quach  PCP verified by CM? Yes    Chart Reviewed: Yes      History Provided by: Patient  Patient Orientation: Alert and Oriented, Person, Place, Situation, Self    Patient Cognition: Alert    Hospitalization in the last 30 days (Readmission):  No    If yes, Readmission Assessment in CM Navigator will be completed. Advance Directives:      Code Status: Full Code   Patient's Primary Decision Maker is: Legal Next of Kin      Discharge Planning:    Patient lives with: Spouse/Significant Other, Family Members Type of Home: House  Primary Care Giver: Self  Patient Support Systems include: Spouse/Significant Other, Family Members   Current Financial resources: Medicare  Current community resources: Housing  Current services prior to admission: Durable Medical Equipment            Current DME: Rolo Luque            Type of Home Care services:  None (may benefit from [facility], PTOT)    ADLS  Prior functional level: Independent in ADLs/IADLs  Current functional level: Other (see comment) (need ptot eval)    PT AM-PAC:   /24  OT AM-PAC:   /24    Family can provide assistance at DC: Yes  Would you like Case Management to discuss the discharge plan with any other family members/significant others, and if so, who?  Yes (wife, Rodrigo Leroy)  Plans to Return to Present Housing: Unknown at present  Other Identified Issues/Barriers to [employer]

## 2023-03-29 NOTE — ED PROVIDER NOTES
I independently performed a history and physical on Raj Cochran. All diagnostic, treatment, and disposition decisions were made by myself in conjunction with the advanced practice provider/resident. For further details of [facility] emergency department encounter, please see the JOCELYNE/resident's documentation. I personally saw the patient and performed a substantive portion of the visit including all aspects of the medical decision making. Briefly, this is a 71-year-old male presenting for evaluation of fall. Patient states that he has history of Parkinson's disease, he has been having multiple falls recently. He denies any specific head injury. No new medicines. On exam, no focal neurological deficits. He has soft compartments. He is noted to have significant CK elevation concerning for rhabdomyolysis. He will be initiated on IV fluid resuscitation and will require admission for continued management. I, Dr. Toby Ruiz, am the primary clinician of record. Comment: Please note this report has been produced using speech recognition software and may contain errors related to that system including errors in grammar, punctuation, and spelling, as well as words and phrases that may be inappropriate. If there are any questions or concerns please feel free to contact the dictating provider for clarification.      Rojas Blood MD  03/28/23 7629
  Vitals:    Vitals:    03/28/23 1719 03/28/23 1821 03/28/23 1843   BP: 130/70 (!) 142/86 137/83   Pulse: 95  87   Resp: 16  16   Temp: 98.8 °F (37.1 °C)     TempSrc: Oral     SpO2: 98% 92% 95%   Weight: 250 lb (113.4 kg)     Height: 6' 2\" (1.88 m)         Patient was given the following medications:  Medications   0.9 % sodium chloride bolus (1,000 mLs IntraVENous New Bag 3/28/23 1850)             Is this patient to be included in the SEP-1 Core Measure due to severe sepsis or septic shock?   No   Exclusion criteria - the patient is NOT to be included for SEP-1 Core Measure due to:  Infection is not suspected    Chronic Conditions affecting care:    has a past medical history of Class 1 obesity due to excess calories with serious comorbidity in adult (05/02/2022), Former cigarette smoker (08/20/2013), Hip pain, Hyperlipidemia, Lumbar radiculitis (09/18/2013), Parkinson's disease (HCC), Primary localized osteoarthrosis, pelvic region and thigh (09/04/2013), and Subclinical hypothyroidism (02/13/2022).    CONSULTS: (Who and What was discussed)  None      Social Determinants Significantly Affecting Health : None    Records Reviewed (External and Source) all pertinent records reviewed    CC/HPI Summary, DDx, ED Course, and Reassessment: Patient presented to the emergency department today with complaints of generalized body aches, weakness, multiple falls.  Patient is a history of Parkinson's disease, he states that he is sore from getting up off of the floor, he states that he feels like he has been doing isometric exercises.  Patient also noted that his urine was very dark.  Patient laboratory studies showed a severely elevated CK that was over 20,000 and had to be diluted to a total of 3575.  Patient has normal renal function, he was given IV fluids, he will need to be admitted to the hospital for further evaluation and treatment, patient AST was significantly elevated at 611.          I am the Primary Clinician of

## 2023-03-29 NOTE — H&P
°C) (Oral)   Resp 16   Ht 6' 2\" (1.88 m)   Wt 250 lb (113.4 kg)   SpO2 98%   BMI 32.10 kg/m²     General appearance: Pleasant, obese male in no apparent distress, appears stated age and cooperative. HEENT:  Pupils equal, round, and reactive to light. Wears glasses extra ocular muscles intact. Conjunctivae/corneas clear. Neck: Supple, with full range of motion. No jugular venous distention. Trachea midline. Respiratory:  Normal respiratory effort. Clear to auscultation, bilaterally without Rales/Wheezes/Rhonchi. Cardiovascular:  Regular rate and rhythm with normal S1/S2 without murmurs, rubs or gallops. Abdomen: Soft, obese, round non-tender, non-distended with normal bowel sounds. Musculoskeletal:  No clubbing, cyanosis or edema bilaterally. Generalized weakness and soreness  Skin: Skin color, texture, turgor normal.  No significant rashes or lesions. Neurologic:  Neurovascularly intact.    Psychiatric:  Alert and oriented, thought content appropriate, normal insight  Capillary Refill: Brisk,3 seconds, normal  Peripheral Pulses: +2 palpable, equal bilaterally     Labs:     Recent Labs     03/28/23  1802   WBC 5.2   HGB 15.6   HCT 44.8   *     Recent Labs     03/28/23  1802      K 3.6   CL 99   CO2 27   BUN 24*   CREATININE 1.2   CALCIUM 8.7     Recent Labs     03/28/23  1802   *   ALT 23   BILITOT 0.6   ALKPHOS 83     Recent Labs     03/28/23  1802   CKTOTAL 3,575*     Urinalysis:      Lab Results   Component Value Date/Time    NITRU Negative 03/28/2023 06:38 PM    WBCUA 3-5 03/28/2023 06:38 PM    BACTERIA 1+ 03/28/2023 06:38 PM    RBCUA 0-2 03/28/2023 06:38 PM    BLOODU LARGE 03/28/2023 06:38 PM    SPECGRAV >=1.030 03/28/2023 06:38 PM    GLUCOSEU Negative 03/28/2023 06:38 PM     Radiology:     CXR: I have reviewed the CXR with the following interpretation: N/A    EKG:  I have reviewed the EKG with the following interpretation: N/A    No orders to display

## 2023-03-30 LAB
ALBUMIN SERPL-MCNC: 3.3 G/DL (ref 3.4–5)
ALBUMIN SERPL-MCNC: 3.3 G/DL (ref 3.4–5)
ALP SERPL-CCNC: 83 U/L (ref 40–129)
ALT SERPL-CCNC: 51 U/L (ref 10–40)
ANION GAP SERPL CALCULATED.3IONS-SCNC: 11 MMOL/L (ref 3–16)
AST SERPL-CCNC: 520 U/L (ref 15–37)
BILIRUB DIRECT SERPL-MCNC: <0.2 MG/DL (ref 0–0.3)
BILIRUB INDIRECT SERPL-MCNC: ABNORMAL MG/DL (ref 0–1)
BILIRUB SERPL-MCNC: 0.3 MG/DL (ref 0–1)
BUN SERPL-MCNC: 15 MG/DL (ref 7–20)
CALCIUM SERPL-MCNC: 8 MG/DL (ref 8.3–10.6)
CHLORIDE SERPL-SCNC: 104 MMOL/L (ref 99–110)
CK SERPL-CCNC: 2697 U/L (ref 39–308)
CO2 SERPL-SCNC: 23 MMOL/L (ref 21–32)
CREAT SERPL-MCNC: 0.8 MG/DL (ref 0.8–1.3)
GFR SERPLBLD CREATININE-BSD FMLA CKD-EPI: >60 ML/MIN/{1.73_M2}
GLUCOSE SERPL-MCNC: 109 MG/DL (ref 70–99)
PHOSPHATE SERPL-MCNC: 2.7 MG/DL (ref 2.5–4.9)
POTASSIUM SERPL-SCNC: 3.1 MMOL/L (ref 3.5–5.1)
PROT SERPL-MCNC: 5.7 G/DL (ref 6.4–8.2)
SODIUM SERPL-SCNC: 138 MMOL/L (ref 136–145)

## 2023-03-30 PROCEDURE — 2580000003 HC RX 258: Performed by: INTERNAL MEDICINE

## 2023-03-30 PROCEDURE — 36415 COLL VENOUS BLD VENIPUNCTURE: CPT

## 2023-03-30 PROCEDURE — 82550 ASSAY OF CK (CPK): CPT

## 2023-03-30 PROCEDURE — 6370000000 HC RX 637 (ALT 250 FOR IP): Performed by: NURSE PRACTITIONER

## 2023-03-30 PROCEDURE — 80069 RENAL FUNCTION PANEL: CPT

## 2023-03-30 PROCEDURE — 6370000000 HC RX 637 (ALT 250 FOR IP): Performed by: INTERNAL MEDICINE

## 2023-03-30 PROCEDURE — 6360000002 HC RX W HCPCS: Performed by: NURSE PRACTITIONER

## 2023-03-30 PROCEDURE — 80076 HEPATIC FUNCTION PANEL: CPT

## 2023-03-30 PROCEDURE — 2580000003 HC RX 258: Performed by: NURSE PRACTITIONER

## 2023-03-30 PROCEDURE — 1200000000 HC SEMI PRIVATE

## 2023-03-30 RX ADMIN — ENOXAPARIN SODIUM 30 MG: 100 INJECTION SUBCUTANEOUS at 08:36

## 2023-03-30 RX ADMIN — CARBIDOPA AND LEVODOPA 2 TABLET: 25; 100 TABLET ORAL at 21:39

## 2023-03-30 RX ADMIN — SODIUM CHLORIDE: 9 INJECTION, SOLUTION INTRAVENOUS at 14:58

## 2023-03-30 RX ADMIN — LISINOPRIL 20 MG: 20 TABLET ORAL at 08:36

## 2023-03-30 RX ADMIN — CARBIDOPA AND LEVODOPA 2 TABLET: 25; 100 TABLET ORAL at 14:54

## 2023-03-30 RX ADMIN — POTASSIUM BICARBONATE 40 MEQ: 782 TABLET, EFFERVESCENT ORAL at 10:35

## 2023-03-30 RX ADMIN — CARBIDOPA AND LEVODOPA 2 TABLET: 25; 100 TABLET ORAL at 08:36

## 2023-03-30 RX ADMIN — ENOXAPARIN SODIUM 30 MG: 100 INJECTION SUBCUTANEOUS at 21:39

## 2023-03-30 RX ADMIN — HYDROCHLOROTHIAZIDE 25 MG: 25 TABLET ORAL at 08:36

## 2023-03-30 RX ADMIN — SODIUM CHLORIDE: 9 INJECTION, SOLUTION INTRAVENOUS at 06:27

## 2023-03-30 ASSESSMENT — PAIN SCALES - GENERAL
PAINLEVEL_OUTOF10: 0

## 2023-03-30 NOTE — CARE COORDINATION
[facility]    Referral received from  to follow for home care services.      [facility] unable to accept    Referral sent to [facility]; spoke w roseanna nassar    Patient has no preference in agency    [address] [place] [address], [place]  [facility] [phone]

## 2023-03-30 NOTE — CARE COORDINATION
PTOT recommend home with [facility]. Referral to [facility]. Maureen Pih will find alternate [facility]. CM will continue to follow.  Alesia Brothers RN

## 2023-03-31 LAB
ALBUMIN SERPL-MCNC: 3.1 G/DL (ref 3.4–5)
ANION GAP SERPL CALCULATED.3IONS-SCNC: 6 MMOL/L (ref 3–16)
BUN SERPL-MCNC: 13 MG/DL (ref 7–20)
CALCIUM SERPL-MCNC: 8.2 MG/DL (ref 8.3–10.6)
CHLORIDE SERPL-SCNC: 106 MMOL/L (ref 99–110)
CK SERPL-CCNC: ABNORMAL U/L (ref 39–308)
CO2 SERPL-SCNC: 28 MMOL/L (ref 21–32)
CREAT SERPL-MCNC: 0.8 MG/DL (ref 0.8–1.3)
GFR SERPLBLD CREATININE-BSD FMLA CKD-EPI: >60 ML/MIN/{1.73_M2}
GLUCOSE SERPL-MCNC: 104 MG/DL (ref 70–99)
PHOSPHATE SERPL-MCNC: 3 MG/DL (ref 2.5–4.9)
POTASSIUM SERPL-SCNC: 3.5 MMOL/L (ref 3.5–5.1)
SODIUM SERPL-SCNC: 140 MMOL/L (ref 136–145)

## 2023-03-31 PROCEDURE — 36415 COLL VENOUS BLD VENIPUNCTURE: CPT

## 2023-03-31 PROCEDURE — 6370000000 HC RX 637 (ALT 250 FOR IP): Performed by: NURSE PRACTITIONER

## 2023-03-31 PROCEDURE — 80069 RENAL FUNCTION PANEL: CPT

## 2023-03-31 PROCEDURE — 6360000002 HC RX W HCPCS: Performed by: NURSE PRACTITIONER

## 2023-03-31 PROCEDURE — 1200000000 HC SEMI PRIVATE

## 2023-03-31 PROCEDURE — 82550 ASSAY OF CK (CPK): CPT

## 2023-03-31 RX ADMIN — CARBIDOPA AND LEVODOPA 2 TABLET: 25; 100 TABLET ORAL at 15:14

## 2023-03-31 RX ADMIN — HYDROCHLOROTHIAZIDE 25 MG: 25 TABLET ORAL at 08:34

## 2023-03-31 RX ADMIN — LISINOPRIL 20 MG: 20 TABLET ORAL at 08:34

## 2023-03-31 RX ADMIN — ENOXAPARIN SODIUM 30 MG: 100 INJECTION SUBCUTANEOUS at 20:30

## 2023-03-31 RX ADMIN — ENOXAPARIN SODIUM 30 MG: 100 INJECTION SUBCUTANEOUS at 08:34

## 2023-03-31 RX ADMIN — CARBIDOPA AND LEVODOPA 2 TABLET: 25; 100 TABLET ORAL at 20:30

## 2023-03-31 RX ADMIN — CARBIDOPA AND LEVODOPA 2 TABLET: 25; 100 TABLET ORAL at 08:34

## 2023-04-01 LAB
ALBUMIN SERPL-MCNC: 3.3 G/DL (ref 3.4–5)
ANION GAP SERPL CALCULATED.3IONS-SCNC: 8 MMOL/L (ref 3–16)
BUN SERPL-MCNC: 12 MG/DL (ref 7–20)
CALCIUM SERPL-MCNC: 8.6 MG/DL (ref 8.3–10.6)
CHLORIDE SERPL-SCNC: 105 MMOL/L (ref 99–110)
CK SERPL-CCNC: 6229 U/L (ref 39–308)
CK SERPL-CCNC: 9025 U/L (ref 39–308)
CO2 SERPL-SCNC: 28 MMOL/L (ref 21–32)
CREAT SERPL-MCNC: 0.8 MG/DL (ref 0.8–1.3)
GFR SERPLBLD CREATININE-BSD FMLA CKD-EPI: >60 ML/MIN/{1.73_M2}
GLUCOSE SERPL-MCNC: 104 MG/DL (ref 70–99)
PHOSPHATE SERPL-MCNC: 2.9 MG/DL (ref 2.5–4.9)
POTASSIUM SERPL-SCNC: 3.7 MMOL/L (ref 3.5–5.1)
SODIUM SERPL-SCNC: 141 MMOL/L (ref 136–145)

## 2023-04-01 PROCEDURE — 6360000002 HC RX W HCPCS: Performed by: NURSE PRACTITIONER

## 2023-04-01 PROCEDURE — 82550 ASSAY OF CK (CPK): CPT

## 2023-04-01 PROCEDURE — 36415 COLL VENOUS BLD VENIPUNCTURE: CPT

## 2023-04-01 PROCEDURE — 2580000003 HC RX 258: Performed by: INTERNAL MEDICINE

## 2023-04-01 PROCEDURE — 6370000000 HC RX 637 (ALT 250 FOR IP): Performed by: NURSE PRACTITIONER

## 2023-04-01 PROCEDURE — 1200000000 HC SEMI PRIVATE

## 2023-04-01 PROCEDURE — 80069 RENAL FUNCTION PANEL: CPT

## 2023-04-01 RX ADMIN — SODIUM CHLORIDE: 9 INJECTION, SOLUTION INTRAVENOUS at 01:42

## 2023-04-01 RX ADMIN — HYDROCHLOROTHIAZIDE 25 MG: 25 TABLET ORAL at 08:18

## 2023-04-01 RX ADMIN — ENOXAPARIN SODIUM 30 MG: 100 INJECTION SUBCUTANEOUS at 19:56

## 2023-04-01 RX ADMIN — LISINOPRIL 20 MG: 20 TABLET ORAL at 08:18

## 2023-04-01 RX ADMIN — CARBIDOPA AND LEVODOPA 2 TABLET: 25; 100 TABLET ORAL at 08:18

## 2023-04-01 RX ADMIN — SODIUM CHLORIDE: 9 INJECTION, SOLUTION INTRAVENOUS at 17:38

## 2023-04-01 RX ADMIN — ENOXAPARIN SODIUM 30 MG: 100 INJECTION SUBCUTANEOUS at 08:18

## 2023-04-01 RX ADMIN — CARBIDOPA AND LEVODOPA 2 TABLET: 25; 100 TABLET ORAL at 14:59

## 2023-04-01 RX ADMIN — CARBIDOPA AND LEVODOPA 2 TABLET: 25; 100 TABLET ORAL at 19:55

## 2023-04-02 LAB
CK SERPL-CCNC: 2716 U/L (ref 39–308)
CK SERPL-CCNC: 4129 U/L (ref 39–308)

## 2023-04-02 PROCEDURE — 82550 ASSAY OF CK (CPK): CPT

## 2023-04-02 PROCEDURE — 36415 COLL VENOUS BLD VENIPUNCTURE: CPT

## 2023-04-02 PROCEDURE — 6370000000 HC RX 637 (ALT 250 FOR IP): Performed by: NURSE PRACTITIONER

## 2023-04-02 PROCEDURE — 2580000003 HC RX 258: Performed by: INTERNAL MEDICINE

## 2023-04-02 PROCEDURE — 6360000002 HC RX W HCPCS: Performed by: NURSE PRACTITIONER

## 2023-04-02 PROCEDURE — 2580000003 HC RX 258: Performed by: NURSE PRACTITIONER

## 2023-04-02 PROCEDURE — 1200000000 HC SEMI PRIVATE

## 2023-04-02 RX ORDER — MECOBALAMIN 5000 MCG
5 TABLET,DISINTEGRATING ORAL NIGHTLY
Status: DISCONTINUED | OUTPATIENT
Start: 2023-04-02 | End: 2023-04-03 | Stop reason: HOSPADM

## 2023-04-02 RX ADMIN — ENOXAPARIN SODIUM 30 MG: 100 INJECTION SUBCUTANEOUS at 08:30

## 2023-04-02 RX ADMIN — Medication 5 MG: at 23:06

## 2023-04-02 RX ADMIN — HYDROCHLOROTHIAZIDE 25 MG: 25 TABLET ORAL at 08:29

## 2023-04-02 RX ADMIN — CARBIDOPA AND LEVODOPA 2 TABLET: 25; 100 TABLET ORAL at 14:51

## 2023-04-02 RX ADMIN — SODIUM CHLORIDE: 9 INJECTION, SOLUTION INTRAVENOUS at 10:13

## 2023-04-02 RX ADMIN — CARBIDOPA AND LEVODOPA 2 TABLET: 25; 100 TABLET ORAL at 22:14

## 2023-04-02 RX ADMIN — SODIUM CHLORIDE, PRESERVATIVE FREE 10 ML: 5 INJECTION INTRAVENOUS at 22:15

## 2023-04-02 RX ADMIN — ENOXAPARIN SODIUM 30 MG: 100 INJECTION SUBCUTANEOUS at 22:15

## 2023-04-02 RX ADMIN — SODIUM CHLORIDE: 9 INJECTION, SOLUTION INTRAVENOUS at 14:51

## 2023-04-02 RX ADMIN — LISINOPRIL 20 MG: 20 TABLET ORAL at 08:30

## 2023-04-02 RX ADMIN — CARBIDOPA AND LEVODOPA 2 TABLET: 25; 100 TABLET ORAL at 08:29

## 2023-04-02 RX ADMIN — SODIUM CHLORIDE: 9 INJECTION, SOLUTION INTRAVENOUS at 19:47

## 2023-04-02 NOTE — DISCHARGE INSTR - COC
Bearin}  Other Medical Equipment (for information only, NOT a DME order):  {EQUIPMENT:[id number]}  Other Treatments: ***    Patient's personal belongings (please select all that are sent with patient):  {CHP DME Belongings:[id number]}    RN SIGNATURE:  {Esignature:[id number]}    CASE MANAGEMENT/SOCIAL WORK SECTION    Inpatient Status Date: 3/28/2023    Readmission Risk Assessment Score:  Readmission Risk              Risk of Unplanned Readmission:  8           Discharging to Facility/ Agency   Name: [facility]  Address:  [address] [place] [address]    Dialysis Facility (if applicable)   Name:  Address:  Dialysis Schedule:  Phone:  Fax:    / signature: Electronically signed by Thelma Deleon RN on 23 at 12:24 PM EDT    PHYSICIAN SECTION    Prognosis: Good    Condition at Discharge: Stable    Rehab Potential (if transferring to Rehab): Good    Recommended Labs or Other Treatments After Discharge: Follow up with PCP in 1 week, repeat CPK lab by the end of the week    Physician Certification: I certify the above information and transfer of Bina Magaña  is necessary for the continuing treatment of the diagnosis listed and that he requires Home Care for less 30 days.      Update Admission H&P: No change in H&P    PHYSICIAN SIGNATURE:  Electronically signed by Liudmila Borges MD on 23 at 12:50 PM EDT

## 2023-04-02 NOTE — DISCHARGE INSTRUCTIONS
Do NOT take your Statin medication until your CK normalizes, Follow up with your PCP this coming week to ensure CK lab test normalizes  -Please stay very well hydrated for the next 1 week

## 2023-04-02 NOTE — CARE COORDINATION
CASE MANAGEMENT DISCHARGE SUMMARY      Discharge to: home with supportive care    New Durable Medical Equipment ordered/agency: na    Transportation:    Family/car: private      Confirmed discharge plan with:RN,[facility]     Patient: yes/no     Family:  yes/no    Name: Contact number:     Facility/Agency, name:  [facility] faxed [phone]   Phone number for report to facility:      RN, name: Lior Burns    Note: Discharging nurse to complete [facility], reconcile [facility], and place final copy with patient's discharge packet. RN to ensure that written prescriptions for  Level II medications are sent with patient to the facility as per protocol.

## 2023-04-03 VITALS
HEIGHT: 74 IN | HEART RATE: 65 BPM | BODY MASS INDEX: 32.08 KG/M2 | OXYGEN SATURATION: 96 % | DIASTOLIC BLOOD PRESSURE: 83 MMHG | WEIGHT: 250 LBS | RESPIRATION RATE: 17 BRPM | SYSTOLIC BLOOD PRESSURE: 142 MMHG | TEMPERATURE: 97.7 F

## 2023-04-03 LAB — CK SERPL-CCNC: 1609 U/L (ref 39–308)

## 2023-04-03 PROCEDURE — 82550 ASSAY OF CK (CPK): CPT

## 2023-04-03 PROCEDURE — 6370000000 HC RX 637 (ALT 250 FOR IP): Performed by: NURSE PRACTITIONER

## 2023-04-03 PROCEDURE — 6360000002 HC RX W HCPCS: Performed by: NURSE PRACTITIONER

## 2023-04-03 PROCEDURE — 2580000003 HC RX 258: Performed by: NURSE PRACTITIONER

## 2023-04-03 PROCEDURE — 36415 COLL VENOUS BLD VENIPUNCTURE: CPT

## 2023-04-03 PROCEDURE — 2580000003 HC RX 258: Performed by: INTERNAL MEDICINE

## 2023-04-03 RX ADMIN — SODIUM CHLORIDE 990 ML: 9 INJECTION, SOLUTION INTRAVENOUS at 00:50

## 2023-04-03 RX ADMIN — HYDROCHLOROTHIAZIDE 25 MG: 25 TABLET ORAL at 09:07

## 2023-04-03 RX ADMIN — CARBIDOPA AND LEVODOPA 2 TABLET: 25; 100 TABLET ORAL at 09:07

## 2023-04-03 RX ADMIN — SODIUM CHLORIDE, PRESERVATIVE FREE 10 ML: 5 INJECTION INTRAVENOUS at 09:07

## 2023-04-03 RX ADMIN — SODIUM CHLORIDE: 9 INJECTION, SOLUTION INTRAVENOUS at 09:34

## 2023-04-03 RX ADMIN — SODIUM CHLORIDE: 9 INJECTION, SOLUTION INTRAVENOUS at 05:06

## 2023-04-03 RX ADMIN — ENOXAPARIN SODIUM 30 MG: 100 INJECTION SUBCUTANEOUS at 09:07

## 2023-04-03 RX ADMIN — LISINOPRIL 20 MG: 20 TABLET ORAL at 09:07

## 2023-04-03 NOTE — CARE COORDINATION
CASE MANAGEMENT DISCHARGE SUMMARY      Discharge to: Home with [facility]    Precertification completed: NA  Hospital Exemption Notification (HENS) completed: NA    IMM given: (date) NA    New Durable Medical Equipment ordered/agency:     Transportation:    Family/car: yes     Confirmed discharge plan with:     Patient: yes     Family:  pt will notify     Facility/Agency, name:  [facility] faxed        RN, name: Laci Harrising    Note: Discharging nurse to complete [facility], reconcile [facility], and place final copy with patient's discharge packet. RN to ensure that written prescriptions for  Level II medications are sent with patient to the facility as per protocol.

## 2023-04-06 NOTE — PROGRESS NOTES
4 Eyes Admission Assessment     I agree as the admission nurse that 2 RN's have performed a thorough Head to Toe Skin Assessment on the patient. ALL assessment sites listed below have been assessed on admission. Areas assessed by both nurses:   [x]   Head, Face, and Ears   [x]   Shoulders, Back, and Chest  [x]   Arms, Elbows, and Hands   [x]   Coccyx, Sacrum, and Ischium  [x]   Legs, Feet, and Heels        Does the Patient have Skin Breakdown? Pt.  Has abrasions on both knees          Avila Prevention initiated:  No   Wound Care Orders initiated:  No      C nurse consulted for Pressure Injury (Stage 3,4, Unstageable, DTI, NWPT, and Complex wounds) or Avila score 18 or lower:  No      Nurse 1 eSignature: Electronically signed by Sailaja Cruz RN on 3/29/23 at 1:49 AM EDT    **SHARE this note so that the co-signing nurse is able to place an eSignature**    Nurse 2 eSignature: Electronically signed by Leda So RN on 3/29/23 at 2:48 AM EDT
[facility] faxed to PCP Dr. Stephanie Shane per request [phone]
Hospitalist Progress Note      PCP: Elmo Dalal    Date of Admission: 3/28/2023    Chief Complaint: Generalized weakness and frequent falls    Hospital Course: reviewed     Subjective: notes some muscle soreness in arms       Medications:  Reviewed    Infusion Medications    sodium chloride      sodium chloride 100 mL/hr at 03/29/23 0612     Scheduled Medications    carbidopa-levodopa  2 tablet Oral TID    sodium chloride flush  5-40 mL IntraVENous 2 times per day    enoxaparin  30 mg SubCUTAneous BID    lisinopril  20 mg Oral Daily    And    hydroCHLOROthiazide  25 mg Oral Daily     PRN Meds: sodium chloride flush, sodium chloride, ondansetron **OR** ondansetron, polyethylene glycol, acetaminophen **OR** acetaminophen      Intake/Output Summary (Last 24 hours) at 3/29/2023 0847  Last data filed at 3/29/2023 0612  Gross per 24 hour   Intake 1719.87 ml   Output 325 ml   Net 1394.87 ml       Physical Exam Performed:    /63   Pulse 73   Temp 98.1 °F (36.7 °C) (Oral)   Resp 18   Ht 6' 2\" (1.88 m)   Wt 250 lb (113.4 kg)   SpO2 96%   BMI 32.10 kg/m²     General appearance: No apparent distress, appears stated age and cooperative. HEENT: Pupils equal, round, and reactive to light. Conjunctivae/corneas clear. Neck: Supple, with full range of motion. No jugular venous distention. Trachea midline. Respiratory:  Normal respiratory effort. Clear to auscultation, bilaterally without Rales/Wheezes/Rhonchi. Cardiovascular: Regular rate and rhythm with normal S1/S2 without murmurs, rubs or gallops. Abdomen: Soft, non-tender, non-distended with normal bowel sounds. Musculoskeletal: No clubbing, cyanosis or edema bilaterally. Full range of motion without deformity. Skin: Skin color, texture, turgor normal.  No rashes or lesions. Neurologic:  Neurovascularly intact without any focal sensory/motor deficits.  Cranial nerves: II-XII intact, grossly non-focal.  Psychiatric: Alert and oriented, thought content
Hospitalist Progress Note      PCP: Marca Gitelman    Date of Admission: 3/28/2023    Chief Complaint: Generalized weakness and frequent falls     Hospital Course: reviewed         Subjective: resting in chair, no complaints, hoping to go home today    Medications:  Reviewed    Infusion Medications   Scheduled Medications   PRN Meds:     No intake or output data in the 24 hours ending 04/04/23 1326    Physical Exam Performed:    BP (!) 142/83   Pulse 65   Temp 97.7 °F (36.5 °C) (Oral)   Resp 17   Ht 6' 2\" (1.88 m)   Wt 250 lb (113.4 kg)   SpO2 96%   BMI 32.10 kg/m²     General appearance: No apparent distress, appears stated age and cooperative. HEENT: Pupils equal, round, and reactive to light. Conjunctivae/corneas clear. Neck: Supple, with full range of motion. No jugular venous distention. Trachea midline. Respiratory:  Normal respiratory effort. Clear to auscultation, bilaterally without Rales/Wheezes/Rhonchi. Cardiovascular: Regular rate and rhythm with normal S1/S2 without murmurs, rubs or gallops. Abdomen: Soft, non-tender, non-distended with normal bowel sounds. Musculoskeletal: No clubbing, cyanosis or edema bilaterally. Full range of motion without deformity. Skin: Skin color, texture, turgor normal.  No rashes or lesions. Neurologic:  Neurovascularly intact without any focal sensory/motor deficits. Cranial nerves: II-XII intact, grossly non-focal.  Psychiatric: Alert and oriented, thought content appropriate, normal insight  Capillary Refill: Brisk, 3 seconds, normal   Peripheral Pulses: +2 palpable, equal bilaterally       Labs:   No results for input(s): WBC, HGB, HCT, PLT in the last 72 hours. No results for input(s): NA, K, CL, CO2, BUN, CREATININE, CALCIUM, PHOS in the last 72 hours. Invalid input(s): MAGNES  No results for input(s): AST, ALT, BILIDIR, BILITOT, ALKPHOS in the last 72 hours. No results for input(s): INR in the last 72 hours.   Recent Labs     04/02/23  9646
Hospitalist Progress Note      PCP: Sofia Khan    Date of Admission: 3/28/2023    Chief Complaint: Generalized weakness and frequent falls     Hospital Course: reviewed        Subjective: resting in bed and just got back from chair this morning, no overnight events       Medications:  Reviewed    Infusion Medications    sodium chloride      sodium chloride 100 mL/hr at 03/30/23 0724     Scheduled Medications    carbidopa-levodopa  2 tablet Oral TID    sodium chloride flush  5-40 mL IntraVENous 2 times per day    enoxaparin  30 mg SubCUTAneous BID    lisinopril  20 mg Oral Daily    And    hydroCHLOROthiazide  25 mg Oral Daily     PRN Meds: sodium chloride flush, sodium chloride, ondansetron **OR** ondansetron, polyethylene glycol, acetaminophen **OR** acetaminophen      Intake/Output Summary (Last 24 hours) at 3/30/2023 0905  Last data filed at 3/30/2023 0724  Gross per 24 hour   Intake 3369.77 ml   Output --   Net 3369.77 ml       Physical Exam Performed:    /75   Pulse 74   Temp 97.7 °F (36.5 °C) (Oral)   Resp 16   Ht 6' 2\" (1.88 m)   Wt 250 lb (113.4 kg)   SpO2 93%   BMI 32.10 kg/m²     General appearance: No apparent distress, appears stated age and cooperative. HEENT: Pupils equal, round, and reactive to light. Conjunctivae/corneas clear. Neck: Supple, with full range of motion. No jugular venous distention. Trachea midline. Respiratory:  Normal respiratory effort. Clear to auscultation, bilaterally without Rales/Wheezes/Rhonchi. Cardiovascular: Regular rate and rhythm with normal S1/S2 without murmurs, rubs or gallops. Abdomen: Soft, non-tender, non-distended with normal bowel sounds. Musculoskeletal: No clubbing, cyanosis or edema bilaterally. Full range of motion without deformity. Skin: Skin color, texture, turgor normal.  No rashes or lesions. Neurologic:  Neurovascularly intact without any focal sensory/motor deficits.  Cranial nerves: II-XII intact, grossly
Patient admitted to room 368 from ED. Patient oriented to room, call light, bed rails, phone, lights and bathroom. Patient instructed about the schedule of the night including: vital sign frequency, lab draws, possible tests, frequency of RN rounding at bedside, daily weights, and I &O's. Patient instructed about prescribed diet, how to use 8MENU, and television. With bed alarm in place, patient aware of placement and reason. Bed locked, in lowest position, side rails up 2/4, call light within reach. Will continue to monitor.
Physical Therapy  Attempted to see pt for therapy. Pt just returned to bed. Will re-attempt as schedule permits. Thank you.   Lowanda Level, PT, DPT
Body mass index is 32 kg/m². Complicating assessment and treatment. Placing patient at risk for multiple co-morbidities as well as early death and contributing to the patient's presentation. Counseled on weight loss      DVT Prophylaxis: lovenox  Diet: ADULT DIET;  Regular  Code Status: Full Code    PT/OT Eval Status: home pt/ot vs 24h sup assist    Dispo - pending nl ck, ?by Monday    Appropriate for A1 Discharge Unit: Alicia Tesfaye MD
patient at risk for multiple co-morbidities as well as early death and contributing to the patient's presentation. Counseled on weight loss      DVT Prophylaxis: lovenox  Diet: ADULT DIET;  Regular  Code Status: Full Code  PT/OT Eval Status: home pt/ot vs 24h sup assist     Dispo - pending much improved ck, ?by Monday, inc ivfs to 200/hr    Appropriate for A1 Discharge Unit: Alicia Nolan MD
weight loss      DVT Prophylaxis: lovenox  Diet: ADULT DIET;  Regular  Code Status: Full Code  PT/OT Eval Status: home pt/ot vs 24h sup assist     Dispo - pending much improved ck, ?by Monday, inc ivfs to 200/hr    Appropriate for A1 Discharge Unit: Alicia Cordova MD
Intact  Standing: With support (RW)  Transfer Training  Overall Level of Assistance: Stand-by assistance; Adaptive equipment; Additional time (RW)  Interventions: Verbal cues; Safety awareness training;Weight shifting training/pressure relief (VC for completing body alignment to surface before sitting)  Sit to Stand: Stand-by assistance; Adaptive equipment; Additional time (EOB>RW, WC>RW)  Stand to Sit: Stand-by assistance; Adaptive equipment; Additional time (RW>EOB, RW>WC)  Toilet Transfer: Contact-guard assistance; Additional time; Adaptive equipment (RW, grab bars, standard toilet)  Gait Training  Gait Training: Yes  Gait  Overall Level of Assistance: Contact-guard assistance; Adaptive equipment; Additional time;Stand-by assistance (with RW, CGA with gait, SBA with stairs using BHR)  Interventions: Safety awareness training;Weight shifting training/pressure relief;Verbal cues; Visual cues  Base of Support: Center of gravity altered;Narrowed  Speed/Nancy: Slow;Shuffled; Other (comment)  Step Length: Right shortened;Left shortened  Gait Abnormalities: Ataxic;Path deviations;Decreased step clearance;Shuffling gait; Other (comment) (occasional freezing of gait, especially with turns)  Distance (ft): 100 Feet (100 ft + 30 ft)  Assistive Device: Walker, rolling;Gait belt  Rail Use: Both  Stairs - Level of Assistance: Stand-by assistance  Number of Stairs Trained: 4                          AM-PAC Score  -PAC Inpatient Mobility Raw Score : 20 (03/29/23 1527)  AM-PAC Inpatient T-Scale Score : 47.67 (03/29/23 1527)  Mobility Inpatient CMS 0-100% Score: 35.83 (03/29/23 1527)  Mobility Inpatient CMS G-Code Modifier : CJ (03/29/23 1527)          Goals  Short Term Goals  Time Frame for Short Term Goals: 4/5/23  Short Term Goal 1: pt will perform bed mobility with mod-ind - MET 3/29  Short Term Goal 2: pt will perform functional transfers with LRAD and mod-ind  Short Term Goal 3: pt will ambulate 150 ft with LRAD and supv  Short Term
SPV    Therapy Time   Individual Concurrent Group Co-treatment   Time In 1400         Time Out 1437         Minutes 37         Timed Code Treatment Minutes: 25 Minutes (12 min eval)    Clifford Watters OTR/L  If pt is unable to be seen after this session, please let this note serve as discharge summary. Please see case management note for discharge disposition. Thank you.

## 2023-08-15 ENCOUNTER — OFFICE VISIT (OUTPATIENT)
Dept: NEUROLOGY | Age: 73
End: 2023-08-15
Payer: COMMERCIAL

## 2023-08-15 VITALS
HEIGHT: 74 IN | OXYGEN SATURATION: 97 % | DIASTOLIC BLOOD PRESSURE: 76 MMHG | HEART RATE: 74 BPM | SYSTOLIC BLOOD PRESSURE: 114 MMHG | WEIGHT: 258 LBS | BODY MASS INDEX: 33.11 KG/M2

## 2023-08-15 DIAGNOSIS — G20 PD (PARKINSON'S DISEASE) (HCC): Primary | ICD-10-CM

## 2023-08-15 PROCEDURE — 1036F TOBACCO NON-USER: CPT | Performed by: PSYCHIATRY & NEUROLOGY

## 2023-08-15 PROCEDURE — 99203 OFFICE O/P NEW LOW 30 MIN: CPT | Performed by: PSYCHIATRY & NEUROLOGY

## 2023-08-15 PROCEDURE — G8417 CALC BMI ABV UP PARAM F/U: HCPCS | Performed by: PSYCHIATRY & NEUROLOGY

## 2023-08-15 PROCEDURE — 1123F ACP DISCUSS/DSCN MKR DOCD: CPT | Performed by: PSYCHIATRY & NEUROLOGY

## 2023-08-15 PROCEDURE — G8427 DOCREV CUR MEDS BY ELIG CLIN: HCPCS | Performed by: PSYCHIATRY & NEUROLOGY

## 2023-08-15 PROCEDURE — 3017F COLORECTAL CA SCREEN DOC REV: CPT | Performed by: PSYCHIATRY & NEUROLOGY

## 2023-08-15 NOTE — PROGRESS NOTES
The patient is a 68y.o. years old male who  was referred by Lisa Sultana  for consultation regarding possible PD    HPI:  The patient describes gradual gait disturbance and difficulties with falling since last fall. Symptoms started gradually. Description feeling off balance and unsteady and had frequent falling. No triggers. No lightheadedness or dizzy upon standing. He denies any tremors or rigidity or stiffness. No family history of Parkinson disease. He is otherwise independent. He takes care of ADL without problem. Denies any cognitive impairment or memory loss. Denies any sleep disturbance, insomnia or parasomnia or symptoms of RBD. No tremors. No bladder or bowel issues, constipation difficulty with taste. No severe depression. He was seen by different neurologist and was diagnosed with possible \"PSP\". He had some neuroimaging however have copies of such neuroimaging. He was started on Sinemet  mg tablet 3 times daily. So far no improvement with such medication however he has not been consistent with taking this medicine 3 times daily. He fell at the end of March and was unable to get up for few hours. He was admitted to the hospital with her acute rhabdomyolysis. Symptoms improved over few days. He denies any neck or back pain today. No other new symptoms today. Other review of system was unremarkable. ROS : A 10-14 system review of constitutional, cardiovascular, respiratory, GI, eyes, , ENT, musculoskeletal, endocrine, skin, SHEENT, genitourinary, psychiatric and neurologic systems was obtained and updated today and is unremarkable except as mentioned in my HPI        Exam:   Constitutional:   Vitals:    08/15/23 1137   BP: 114/76   Site: Left Wrist   Position: Sitting   Pulse: 74   SpO2: 97%   Weight: 258 lb (117 kg)   Height: 6' 2\" (1.88 m)       General appearance:  Normal development and appear in no acute distress.    Mental Status:   Oriented to person, place,

## 2023-08-16 ENCOUNTER — TELEPHONE (OUTPATIENT)
Dept: NEUROLOGY | Age: 73
End: 2023-08-16

## 2023-08-16 DIAGNOSIS — G20 PD (PARKINSON'S DISEASE) (HCC): Primary | ICD-10-CM

## 2023-08-16 NOTE — TELEPHONE ENCOUNTER
Called and spoke to patient. Let him know that Dr. Lauren Hood received records from [address] [place] [address] and upon review Dr. Lauren Hood wants him to do a [employer]. Patient agreed with recommendations of proceeding with Julio Elkins. Gave patient central scheduling number to schedule. Called [facility] to verify the correct order of Julio Elkins. [facility] ([id number])    Order placed.

## 2023-09-06 ENCOUNTER — HOSPITAL ENCOUNTER (OUTPATIENT)
Dept: NUCLEAR MEDICINE | Age: 73
Discharge: HOME | End: 2023-09-06
Payer: COMMERCIAL

## 2023-09-06 DIAGNOSIS — G20 PD (PARKINSON'S DISEASE) (HCC): ICD-10-CM

## 2023-09-06 PROCEDURE — A9584 IODINE I-123 IOFLUPANE: HCPCS | Performed by: PSYCHIATRY & NEUROLOGY

## 2023-09-06 PROCEDURE — 78803 RP LOCLZJ TUM SPECT 1 AREA: CPT

## 2023-09-06 PROCEDURE — 3430000000 HC RX DIAGNOSTIC RADIOPHARMACEUTICAL: Performed by: PSYCHIATRY & NEUROLOGY

## 2023-09-06 PROCEDURE — 6370000000 HC RX 637 (ALT 250 FOR IP): Performed by: PSYCHIATRY & NEUROLOGY

## 2023-09-06 RX ORDER — IODINE SOLUTION STRONG 5% (LUGOL'S) 5 %
0.2 SOLUTION ORAL
Status: COMPLETED | OUTPATIENT
Start: 2023-09-06 | End: 2023-09-06

## 2023-09-06 RX ADMIN — IOFLUPANE I-123 4.5 MILLICURIE: 2 INJECTION, SOLUTION INTRAVENOUS at 11:10

## 2023-09-06 RX ADMIN — IODINE SOLUTION STRONG 5% (LUGOL'S) 0.2 ML: 5 SOLUTION at 10:10

## 2023-10-26 ENCOUNTER — OFFICE VISIT (OUTPATIENT)
Dept: NEUROLOGY | Age: 73
End: 2023-10-26
Payer: COMMERCIAL

## 2023-10-26 ENCOUNTER — HOSPITAL ENCOUNTER (OUTPATIENT)
Age: 73
Discharge: HOME | End: 2023-10-26
Payer: COMMERCIAL

## 2023-10-26 VITALS
DIASTOLIC BLOOD PRESSURE: 65 MMHG | HEIGHT: 74 IN | OXYGEN SATURATION: 98 % | HEART RATE: 94 BPM | WEIGHT: 259 LBS | BODY MASS INDEX: 33.24 KG/M2 | SYSTOLIC BLOOD PRESSURE: 128 MMHG

## 2023-10-26 DIAGNOSIS — R27.0 ATAXIA: ICD-10-CM

## 2023-10-26 DIAGNOSIS — G21.9 SECONDARY PARKINSONISM, UNSPECIFIED SECONDARY PARKINSONISM TYPE (HCC): Primary | ICD-10-CM

## 2023-10-26 DIAGNOSIS — I10 HTN (HYPERTENSION), BENIGN: ICD-10-CM

## 2023-10-26 PROCEDURE — G8427 DOCREV CUR MEDS BY ELIG CLIN: HCPCS | Performed by: PSYCHIATRY & NEUROLOGY

## 2023-10-26 PROCEDURE — 3074F SYST BP LT 130 MM HG: CPT | Performed by: PSYCHIATRY & NEUROLOGY

## 2023-10-26 PROCEDURE — 36415 COLL VENOUS BLD VENIPUNCTURE: CPT

## 2023-10-26 PROCEDURE — 1036F TOBACCO NON-USER: CPT | Performed by: PSYCHIATRY & NEUROLOGY

## 2023-10-26 PROCEDURE — 1123F ACP DISCUSS/DSCN MKR DOCD: CPT | Performed by: PSYCHIATRY & NEUROLOGY

## 2023-10-26 PROCEDURE — G8417 CALC BMI ABV UP PARAM F/U: HCPCS | Performed by: PSYCHIATRY & NEUROLOGY

## 2023-10-26 PROCEDURE — 3017F COLORECTAL CA SCREEN DOC REV: CPT | Performed by: PSYCHIATRY & NEUROLOGY

## 2023-10-26 PROCEDURE — G8484 FLU IMMUNIZE NO ADMIN: HCPCS | Performed by: PSYCHIATRY & NEUROLOGY

## 2023-10-26 PROCEDURE — 82607 VITAMIN B-12: CPT

## 2023-10-26 PROCEDURE — 82746 ASSAY OF FOLIC ACID SERUM: CPT

## 2023-10-26 PROCEDURE — 99213 OFFICE O/P EST LOW 20 MIN: CPT | Performed by: PSYCHIATRY & NEUROLOGY

## 2023-10-26 PROCEDURE — 3078F DIAST BP <80 MM HG: CPT | Performed by: PSYCHIATRY & NEUROLOGY

## 2023-10-26 RX ORDER — LISINOPRIL AND HYDROCHLOROTHIAZIDE 20; 12.5 MG/1; MG/1
TABLET ORAL
COMMUNITY
Start: 2023-10-16

## 2023-10-26 NOTE — PROGRESS NOTES
The patient came today for follow up regarding: Possible Parkinsonism    Since the patient's last visit, he had his TEODORO study which was normal.  He is on Sinemet  mg 3 times daily. So far no significant change with such medicine. Still complaining of off balance and unsteady but no falling. No dizziness lightheadedness or rigidity. No resting tremors, memory impairment, severe mood swings, insomnia or parasomnia. No changes compared to last visit. He is on the same blood pressure medicine. Exam:   Constitutional:   Vitals:    10/26/23 1359   BP: 128/65   Site: Left Wrist   Position: Sitting   Pulse: 94   SpO2: 98%   Weight: 117.5 kg (259 lb)   Height: 1.88 m (6' 2\")       General appearance:  Normal development and appear in no acute distress. Mental Status:   Oriented to person, place, problem, and time. Memory: Good immediate recall. Intact remote memory  Normal attention span and concentration. Language: intact naming, repeating and fluency   Good fund of Knowledge. Aware of current events and vocabulary   Cranial Nerves:   II: Pupils: equal, round, reactive to light  III,IV,VI: Extra Ocular Movements are intact. No nystagmus  V: Facial sensation is intact   VII: Facial strength and movements: intact and symmetric  XII: Tongue movements are normal  Musculoskeletal: 5/5 in all 4 extremities. Tone: Normal tone. Coordination: no tremors or drift  DTR: symmetric 2+ in the arms and 1 in the legs   Sensation: normal to all modalities in both arms and legs. Gait/Posture: Wide-based, slightly unsteady but good arm swing and no postural instability    ROS : A 10-14 system review of constitutional, cardiovascular, respiratory, GI, eyes, , ENT, musculoskeletal, endocrine, hematological, skin, SHEENT, genitourinary, psychiatric and neurologic systems was obtained and updated today which is unremarkable except as mentioned in my HPI      Medical decision making:    A.  Problems (any

## 2023-10-27 LAB
FOLATE SERPL-MCNC: 10.03 NG/ML (ref 4.78–24.2)
VIT B12 SERPL-MCNC: 399 PG/ML (ref 211–911)

## 2023-11-10 ENCOUNTER — TELEPHONE (OUTPATIENT)
Dept: NEUROLOGY | Age: 73
End: 2023-11-10

## 2023-11-13 NOTE — TELEPHONE ENCOUNTER
LOV: 10/26/23  NOV: nothing scheduled at this time    Preferred pharmacy:     Name of caller: Cass Cerna     Reason for call: Cass Cerna called today to give a phone update. Since stopping the Sinemet  mg he states that he has had good days and bad days but he had those while he was taking the medication. Cass Cerna states that when he is out and about he does really well with walking and thinks that is because he has more room and is able to focus on just walking when he is in the kitchen for example he finds that he shuffles his feet more but he thinks that is because the space is smaller and he has other thing that he has to focus on as well. Other then that he says that he feels pretty well. Please advise?
Pt advised appt made
Pt advised of the message below and would like to hold off on the referral and follow up with you instead, how long should he wait before following up
arrest of descent while pushing/abnormal second phase labor

## 2024-04-03 ENCOUNTER — TELEPHONE (OUTPATIENT)
Dept: NEUROLOGY | Age: 74
End: 2024-04-03

## 2024-04-03 DIAGNOSIS — G20.A1 PARKINSON'S DISEASE WITHOUT DYSKINESIA OR FLUCTUATING MANIFESTATIONS (HCC): Primary | ICD-10-CM

## 2024-04-03 NOTE — TELEPHONE ENCOUNTER
LOV 10/26/23  NOV 10/22/24  Last filled 2/13/23    Pharm[facility]: [facility]    Refill request for the carbidopa-Levodopa  mg 1 tablet by mouth in the morning, noon and bedtime.    Patient states he started taking again in November. He states that he would like to see  movement clinic.     Please review.

## 2024-10-10 PROBLEM — I10 ESSENTIAL HYPERTENSION: Status: ACTIVE | Noted: 2023-02-17

## 2024-10-10 PROBLEM — G20.C PARKINSONISM (HCC): Status: ACTIVE | Noted: 2023-04-17

## 2024-10-10 PROBLEM — R27.0 ATAXIA: Status: ACTIVE | Noted: 2022-12-07

## 2024-10-10 PROBLEM — H26.9 CATARACT: Status: ACTIVE | Noted: 2023-04-19

## 2024-10-10 PROBLEM — M20.41 ACQUIRED HAMMER TOE OF RIGHT FOOT: Status: ACTIVE | Noted: 2023-12-18

## 2024-10-10 PROBLEM — I65.29 CAROTID ARTERY STENOSIS: Status: ACTIVE | Noted: 2022-12-06

## 2024-10-21 ENCOUNTER — OFFICE VISIT (OUTPATIENT)
Dept: NEUROLOGY | Age: 74
End: 2024-10-21
Payer: COMMERCIAL

## 2024-10-21 VITALS
HEART RATE: 97 BPM | HEIGHT: 74 IN | OXYGEN SATURATION: 94 % | WEIGHT: 267.5 LBS | DIASTOLIC BLOOD PRESSURE: 73 MMHG | SYSTOLIC BLOOD PRESSURE: 138 MMHG | BODY MASS INDEX: 34.33 KG/M2

## 2024-10-21 DIAGNOSIS — R79.9 ABNORMAL FINDING OF BLOOD CHEMISTRY, UNSPECIFIED: ICD-10-CM

## 2024-10-21 DIAGNOSIS — R26.9 GAIT DISORDER: Primary | ICD-10-CM

## 2024-10-21 DIAGNOSIS — R53.83 OTHER FATIGUE: ICD-10-CM

## 2024-10-21 DIAGNOSIS — G62.9 POLYNEUROPATHY: ICD-10-CM

## 2024-10-21 DIAGNOSIS — R25.1 TREMULOUSNESS: ICD-10-CM

## 2024-10-21 PROBLEM — G20.C PARKINSONISM (HCC): Status: RESOLVED | Noted: 2023-04-17 | Resolved: 2024-10-21

## 2024-10-21 PROBLEM — G20.A1 PARKINSON'S DISEASE (HCC): Status: RESOLVED | Noted: 2023-03-28 | Resolved: 2024-10-21

## 2024-10-21 PROBLEM — G21.9 SECONDARY PARKINSONISM (HCC): Status: RESOLVED | Noted: 2023-10-26 | Resolved: 2024-10-21

## 2024-10-21 PROCEDURE — 99215 OFFICE O/P EST HI 40 MIN: CPT | Performed by: STUDENT IN AN ORGANIZED HEALTH CARE EDUCATION/TRAINING PROGRAM

## 2024-10-21 PROCEDURE — 3075F SYST BP GE 130 - 139MM HG: CPT | Performed by: STUDENT IN AN ORGANIZED HEALTH CARE EDUCATION/TRAINING PROGRAM

## 2024-10-21 PROCEDURE — 1123F ACP DISCUSS/DSCN MKR DOCD: CPT | Performed by: STUDENT IN AN ORGANIZED HEALTH CARE EDUCATION/TRAINING PROGRAM

## 2024-10-21 PROCEDURE — 3078F DIAST BP <80 MM HG: CPT | Performed by: STUDENT IN AN ORGANIZED HEALTH CARE EDUCATION/TRAINING PROGRAM

## 2024-10-21 RX ORDER — CARBIDOPA AND LEVODOPA 25; 100 MG/1; MG/1
TABLET ORAL
Qty: 180 TABLET | Refills: 11 | Status: SHIPPED | OUTPATIENT
Start: 2024-10-21 | End: 2025-10-23

## 2024-10-21 NOTE — PROGRESS NOTES
Geoff Wayne (:  1950) is a 74 y.o. male,Established patient, here for evaluation of the following chief complaint(s):  Follow-up (Former Jean Paul pt, PD)      Assessment & Plan   1. Gait disorder  Assessment & Plan:   Chronic, not at goal (unstable), changes made today: increase Sinemet IR 25/100 gradually from 1 tab TID to 2 tab TID and medication adherence emphasized. 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 Notably does not appear to have true bradykinesia, exaggerated startle. Previous DaTScan was normal and no known exposure to neuroleptic. He thinks the Sinemet provides a modest benefit and physical therapy was helpful in the past as well. I do not think he has parkinsonism. I suspect this is a motor manifestation of neurodegenerative disease (possibly Alzheimer's disease given myoclonic tremulousness, FTD) and/or cerebrovascular disease. Time course is atypically slow for prion disease. His phenotype is atypical for stiff person syndrome but this would be more readily treatable. Pursuing work up described below including AD biomarker, brain MRI, and autoimmune work up for possible atypical manifestation of stiff person syndrome.    Orders:  -     MRI BRAIN W WO CONTRAST; Future  -     MRI CERVICAL SPINE W WO CONTRAST; Future  -     MISCELLANEOUS [facility]: Movement Disorder, Autoimmune/Paraneoplastic Evaluation, Spinal Fluid; Test Id : MDC2; Future  -     [facility]: Movement Disorder, Autoimmune/Paraneoplastic Evaluation, Serum; Test Id : MDS2; Future  -     IR LUMBAR PUNCTURE FOR DIAGNOSIS; Future  -     Culture, CSF;

## 2024-10-21 NOTE — ASSESSMENT & PLAN NOTE
Chronic, not at goal (unstable), changes made today: increase Sinemet IR 25/100 gradually from 1 tab TID to 2 tab TID and medication adherence emphasized. 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 Notably does not appear to have true bradykinesia, exaggerated startle. Previous DaTScan was normal and no known exposure to neuroleptic. He thinks the Sinemet provides a modest benefit and physical therapy was helpful in the past as well. I do not think he has parkinsonism. I suspect this is a motor manifestation of neurodegenerative disease (possibly Alzheimer's disease given myoclonic tremulousness, FTD) and/or cerebrovascular disease. Time course is atypically slow for prion disease. His phenotype is atypical for stiff person syndrome but this would be more readily treatable. Pursuing work up described below including AD biomarker, brain MRI, and autoimmune work up for possible atypical manifestation of stiff person syndrome.

## 2024-10-21 NOTE — ASSESSMENT & PLAN NOTE
Chronic, at goal (stable), changes made today: work up for length dependent sensory predominant polyneuropathy based on examination. May consider EMG/NCS in the future. No neuropathic pain.

## 2024-10-23 ENCOUNTER — TELEPHONE (OUTPATIENT)
Dept: INTERVENTIONAL RADIOLOGY/VASCULAR | Age: 74
End: 2024-10-23

## 2024-10-23 NOTE — TELEPHONE ENCOUNTER
Called and spoke to Geoff about upcoming procedure. Phone number used: [phone]  Procedure:  lumbar puncture  Approving Radiologist: not needed    Pt informed of the following:  Date of procedure: 10/25/24  Arrival time of procedure: 0930  Time of procedure: 1130    On any blood thinners?No. If yes:   Instructed to hold thinners for days prior to test    On any GLP-1 Agonists? ( Ozempic, Jardiance, Semaglutide) No. Instructed to hold for  days.     Blood pressure medication? No. If yes need to make sure take morning of procedure.     Any issues with sedation in the past? no  Will receive versed and fentanyl for sedation will need a  day of procedure.     H&P or office note within 30 days? yes - 10/21/24    After procedure will be here 2-4 hours after procedure for monitoring.     Nothing to eat or drink at midnight.     Need COVID testing prior: No    Need SDS: Yes

## 2024-10-25 ENCOUNTER — HOSPITAL ENCOUNTER (OUTPATIENT)
Dept: INTERVENTIONAL RADIOLOGY/VASCULAR | Age: 74
Discharge: HOME | End: 2024-10-25
Attending: STUDENT IN AN ORGANIZED HEALTH CARE EDUCATION/TRAINING PROGRAM
Payer: COMMERCIAL

## 2024-10-25 VITALS
TEMPERATURE: 99 F | DIASTOLIC BLOOD PRESSURE: 78 MMHG | OXYGEN SATURATION: 98 % | SYSTOLIC BLOOD PRESSURE: 150 MMHG | HEART RATE: 88 BPM | RESPIRATION RATE: 16 BRPM

## 2024-10-25 DIAGNOSIS — R26.9 GAIT DISORDER: ICD-10-CM

## 2024-10-25 LAB
APPEARANCE CSF: CLEAR
CLOT EVALUATION CSF: ABNORMAL
CLOT EVALUATION CSF: ABNORMAL
COLOR CSF: COLORLESS
COLOR CSF: COLORLESS
DEPRECATED RDW RBC AUTO: 12.6 % (ref 12.4–15.4)
EST. AVERAGE GLUCOSE BLD GHB EST-MCNC: 108.3 MG/DL
GLUCOSE CSF-MCNC: 77 MG/DL (ref 40–80)
HBA1C MFR BLD: 5.4 %
HCT VFR BLD AUTO: 43.4 % (ref 40.5–52.5)
HGB BLD-MCNC: 14.5 G/DL (ref 13.5–17.5)
IGA SERPL-MCNC: 372 MG/DL (ref 70–400)
INR PPP: 1.04 (ref 0.85–1.15)
MANUAL DIF COMMENT CSF-IMP: ABNORMAL
MANUAL DIF COMMENT CSF-IMP: ABNORMAL
MCH RBC QN AUTO: 30.1 PG (ref 26–34)
MCHC RBC AUTO-ENTMCNC: 33.4 G/DL (ref 31–36)
MCV RBC AUTO: 90.1 FL (ref 80–100)
NUC CELL # FLD MANUAL: 0 /CUMM (ref 0–5)
NUC CELL # FLD MANUAL: 0 /CUMM (ref 0–5)
PLATELET # BLD AUTO: 182 K/UL (ref 135–450)
PMV BLD AUTO: 7.7 FL (ref 5–10.5)
PROT CSF-MCNC: 96 MG/DL (ref 15–45)
PROT SERPL-MCNC: 6.3 G/DL (ref 6.4–8.2)
PROTHROMBIN TIME: 13.8 SEC (ref 11.9–14.9)
RBC # BLD AUTO: 4.82 M/UL (ref 4.2–5.9)
RBC # FLD MANUAL: 4 /CUMM
RBC # FLD MANUAL: 87 /CUMM
TSH SERPL DL<=0.005 MIU/L-ACNC: 3.37 UIU/ML (ref 0.27–4.2)
TUBE # CSF: ABNORMAL
TUBE # CSF: ABNORMAL
VIT B12 SERPL-MCNC: 428 PG/ML (ref 211–911)
WBC # BLD AUTO: 6.3 K/UL (ref 4–11)

## 2024-10-25 PROCEDURE — 62328 DX LMBR SPI PNXR W/FLUOR/CT: CPT

## 2024-10-25 RX ORDER — SODIUM CHLORIDE 0.9 % (FLUSH) 0.9 %
5-40 SYRINGE (ML) INJECTION PRN
Status: DISCONTINUED | OUTPATIENT
Start: 2024-10-25 | End: 2024-10-26 | Stop reason: HOSPADM

## 2024-10-25 RX ORDER — SODIUM CHLORIDE 9 MG/ML
INJECTION, SOLUTION INTRAVENOUS PRN
Status: DISCONTINUED | OUTPATIENT
Start: 2024-10-25 | End: 2024-10-26 | Stop reason: HOSPADM

## 2024-10-25 RX ORDER — SODIUM CHLORIDE 0.9 % (FLUSH) 0.9 %
5-40 SYRINGE (ML) INJECTION EVERY 12 HOURS SCHEDULED
Status: DISCONTINUED | OUTPATIENT
Start: 2024-10-25 | End: 2024-10-26 | Stop reason: HOSPADM

## 2024-10-26 LAB
ALBUMIN CSF-MCNC: 59.6 MG/DL (ref 10–30)
ALBUMIN SERPL-MCNC: 4343 MG/DL (ref 3400–5000)
BACTERIA CSF CULT: NORMAL
GRAM STN SPEC: NORMAL
IGG CSF-MCNC: 4.12 MG/DL (ref 0–6)
IGG CSF/SERPL: 0.47 {RATIO} (ref 0.2–0.7)
IGG SERPL-MCNC: 633 MG/DL (ref 700–1600)
IGG SYNTH RATE SER+CSF CALC-MRATE: -0.7 MG/DAY (ref -9.9–3.3)
PROT CSF-MCNC: 96 MG/DL (ref 15–45)
TISSUE TRANSGLUTAMINASE IGA: <0.5 U/ML (ref 0–14)

## 2024-10-28 ENCOUNTER — APPOINTMENT (OUTPATIENT)
Dept: MRI IMAGING | Age: 74
End: 2024-10-28
Attending: STUDENT IN AN ORGANIZED HEALTH CARE EDUCATION/TRAINING PROGRAM
Payer: COMMERCIAL

## 2024-10-28 ENCOUNTER — HOSPITAL ENCOUNTER (OUTPATIENT)
Dept: MRI IMAGING | Age: 74
Discharge: HOME | End: 2024-10-28
Attending: STUDENT IN AN ORGANIZED HEALTH CARE EDUCATION/TRAINING PROGRAM
Payer: COMMERCIAL

## 2024-10-28 DIAGNOSIS — R26.9 GAIT DISORDER: ICD-10-CM

## 2024-10-28 LAB
ALBUMIN SERPL ELPH-MCNC: 3.5 G/DL (ref 3.1–4.9)
ALPHA1 GLOB SERPL ELPH-MCNC: 0.3 G/DL (ref 0.2–0.4)
ALPHA2 GLOB SERPL ELPH-MCNC: 0.7 G/DL (ref 0.4–1.1)
B-GLOBULIN SERPL ELPH-MCNC: 1.1 G/DL (ref 0.9–1.6)
GAMMA GLOB SERPL ELPH-MCNC: 0.8 G/DL (ref 0.6–1.8)
MISCELLANEOUS LAB TEST ORDER: NORMAL
PERFORMED ON: NORMAL
POC CREATININE: 0.9 MG/DL (ref 0.8–1.3)
POC SAMPLE TYPE: NORMAL
PROT SERPL-MCNC: 6.3 G/DL (ref 6.4–8.2)

## 2024-10-28 PROCEDURE — 6360000004 HC RX CONTRAST MEDICATION: Performed by: STUDENT IN AN ORGANIZED HEALTH CARE EDUCATION/TRAINING PROGRAM

## 2024-10-28 PROCEDURE — 70553 MRI BRAIN STEM W/O & W/DYE: CPT

## 2024-10-28 PROCEDURE — 72156 MRI NECK SPINE W/O & W/DYE: CPT

## 2024-10-28 PROCEDURE — 82565 ASSAY OF CREATININE: CPT

## 2024-10-28 PROCEDURE — A9579 GAD-BASE MR CONTRAST NOS,1ML: HCPCS | Performed by: STUDENT IN AN ORGANIZED HEALTH CARE EDUCATION/TRAINING PROGRAM

## 2024-10-28 RX ADMIN — GADOTERIDOL 20 ML: 279.3 INJECTION, SOLUTION INTRAVENOUS at 09:19

## 2024-10-29 LAB — METHYLMALONATE SERPL-SCNC: 0.31 UMOL/L (ref 0–0.4)

## 2024-10-30 LAB — SPE/IFE INTERPRETATION: NORMAL

## 2024-10-31 ENCOUNTER — HOSPITAL ENCOUNTER (OUTPATIENT)
Dept: PHYSICAL THERAPY | Age: 74
Setting detail: THERAPIES SERIES
Discharge: HOME | End: 2024-10-31
Attending: STUDENT IN AN ORGANIZED HEALTH CARE EDUCATION/TRAINING PROGRAM
Payer: COMMERCIAL

## 2024-10-31 DIAGNOSIS — R26.9 GAIT ABNORMALITY: Primary | ICD-10-CM

## 2024-10-31 LAB — PROT PATTERN CSF ELPH-IMP: NORMAL

## 2024-10-31 PROCEDURE — 97116 GAIT TRAINING THERAPY: CPT

## 2024-10-31 PROCEDURE — 97530 THERAPEUTIC ACTIVITIES: CPT

## 2024-10-31 PROCEDURE — 97162 PT EVAL MOD COMPLEX 30 MIN: CPT

## 2024-10-31 NOTE — PLAN OF CARE
[facility]- Outpatient Rehabilitation and Therapy  [address] [place] [address] office: [phone] fax: [phone]     Physical Therapy Initial Evaluation Certification    Dear Leonel Huynh MD,    We had the pleasure of evaluating the following patient for physical therapy services at [facility].  A summary of our findings can be found in the initial assessment below.  This includes our plan of care.  If you have any questions or concerns regarding these findings, please do not hesitate to contact me at the office phone number listed above.  Thank you for the referral.     Physician Signature:_______________________________Date:__________________  By signing above (or electronic signature), therapist’s plan is approved by physician       Physical Therapy: TREATMENT/PROGRESS NOTE   Patient: Geoff Wayne (74 y.o. male)   Examination Date: 10/31/2024   :  1950 MRN: [id number]   Visit #: 1   Insurance Allowable Auth Needed   BMN, $45 copay []Yes    [x]No    Insurance: Payor: [employer] / Plan: [employer] HEALTH PLANS / Product Type: *No Product type* /   Insurance ID: [id number] - (Medicare Managed)  Secondary Insurance (if applicable):    Treatment Diagnosis:     ICD-10-CM    1. Gait abnormality  R26.9          Medical Diagnosis:  Gait disorder [R26.9]   Referring Physician: Leonel Huynh MD  PCP: Jeff Marks DO     Plan of care signed (Y/N): no     Date of Patient follow up with Physician: 3 month f/u in 2025, pending imaging/work up after last neurology appt.      Plan of Care Report: EVAL today, POC update due: (10 visits /OR AUTH LIMITS, whichever is less)  2024                                             Medical History:  Comorbidities:  Hypertension  Osteoarthritis  Other: hyperlipidemia  Relevant Medical History: takes sinemet for motor control issues (has taken for several years)

## 2024-11-01 LAB
BACTERIA CSF CULT: NORMAL
GRAM STN SPEC: NORMAL
OLIGOCLONAL BANDS CSF QL: 0
OLIGOCLONAL BANDS SERPL QL: 0

## 2024-11-05 ENCOUNTER — HOSPITAL ENCOUNTER (OUTPATIENT)
Dept: PHYSICAL THERAPY | Age: 74
Setting detail: THERAPIES SERIES
Discharge: HOME | End: 2024-11-05
Attending: STUDENT IN AN ORGANIZED HEALTH CARE EDUCATION/TRAINING PROGRAM
Payer: COMMERCIAL

## 2024-11-05 PROCEDURE — 97112 NEUROMUSCULAR REEDUCATION: CPT

## 2024-11-05 NOTE — FLOWSHEET NOTE
Point Release, and Myofascial Release  Modalities as needed that may include: Cryotherapy and Thermal Agents  Patient education on joint protection, postural re-education, activity modification, and progression of HEP    Plan: Cont POC- Continue emphasis/focus on improving proper muscle recruitment and activation/motor control patterns and kinesthetic sense and proprioception. Next visit plan to emphasize turning around without freezing and overground walking     Electronically Signed by Catalina Delcid PT  Date: 11/05/2024      Note: Portions of this note have been templated and/or copied from initial evaluation, reassessments and prior notes for documentation efficiency.      Note: If patient does not return for scheduled/recommended follow up visits, this note will serve as a discharge from care along with the most recent update on progress.    Neuro Evaluation

## 2024-11-07 ENCOUNTER — HOSPITAL ENCOUNTER (OUTPATIENT)
Dept: PHYSICAL THERAPY | Age: 74
Setting detail: THERAPIES SERIES
Discharge: HOME | End: 2024-11-07
Attending: STUDENT IN AN ORGANIZED HEALTH CARE EDUCATION/TRAINING PROGRAM
Payer: COMMERCIAL

## 2024-11-07 LAB — REPORT: NORMAL

## 2024-11-07 PROCEDURE — 97116 GAIT TRAINING THERAPY: CPT

## 2024-11-07 NOTE — FLOWSHEET NOTE
interventions:    Interventions:  Therapeutic Exercise (00340) including: strength training, ROM, and functional mobility  Therapeutic Activities (00430) including: functional mobility training and education.  Neuromuscular Re-education (20512) activation and proprioception, including postural re-education.    Gait Training (54097) for normalization of ambulation patterns and AD training.   Manual Therapy (28130) as indicated to include: Passive Range of Motion, Gr I-IV mobilizations, Soft Tissue Mobilization, Trigger Point Release, and Myofascial Release  Modalities as needed that may include: Cryotherapy and Thermal Agents  Patient education on joint protection, postural re-education, activity modification, and progression of HEP    Plan: Cont POC- Continue emphasis/focus on improving proper muscle recruitment and activation/motor control patterns and kinesthetic sense and proprioception. Next visit plan to emphasize turning around without freezing and overground walking     Electronically Signed by Montserrat Morales PT  Date: 11/07/2024      Note: Portions of this note have been templated and/or copied from initial evaluation, reassessments and prior notes for documentation efficiency.      Note: If patient does not return for scheduled/recommended follow up visits, this note will serve as a discharge from care along with the most recent update on progress.

## 2024-11-11 ENCOUNTER — HOSPITAL ENCOUNTER (OUTPATIENT)
Dept: PHYSICAL THERAPY | Age: 74
Setting detail: THERAPIES SERIES
Discharge: HOME | End: 2024-11-11
Attending: STUDENT IN AN ORGANIZED HEALTH CARE EDUCATION/TRAINING PROGRAM
Payer: COMMERCIAL

## 2024-11-11 PROCEDURE — 97116 GAIT TRAINING THERAPY: CPT

## 2024-11-11 PROCEDURE — 97112 NEUROMUSCULAR REEDUCATION: CPT

## 2024-11-11 NOTE — FLOWSHEET NOTE
activities  (73124) GAIT RE-EDUCATION - Provided training and instruction to the patient for proper joint and muscle recruitment and positioning and eccentric body weight control with ambulation re-education including up and down stairs. Therapeutic procedure, one or more areas, each 15 minutes;     GOALS     Patient stated goal: \"Walk without stopping\"   [] Progressing: [] Met: [] Not Met: [] Adjusted    Therapist goals for Patient:   Short Term Goals: To be achieved in: 4 weeks   1.  Independent in HEP and progression per patient tolerance, in order to increase independence with functional mobility.  [] Progressing: [] Met: [] Not Met: [] Adjusted  2. Patient will improve 5x sit to 15 seconds to demonstrate improvement in LE muscle endurance.  [] Progressing: [] Met: [] Not Met: [] Adjusted  3. Pt will improve TUG to 8 seconds to demonstrate improved gait speed and reduce risk for falls.  [] Progressing: [] Met: [] Not Met: [] Adjusted  4. Patient will improve 6 Min Walk Test to 500 with no AD to demonstrate improved endurance for community ambulation.  [] Progressing: [] Met: [] Not Met: [] Adjusted    Long Term Goals: To be achieved in: 8 weeks  1. Disability index score of 15% or less for the LEFS to assist with reaching prior level of function with activities such as walking a mile.  [] Progressing: [] Met: [] Not Met: [] Adjusted  2. Patient will improve 6 Min Walk Test to >750' with no AD to demonstrate improved endurance for community ambulation.  [] Progressing: [] Met: [] Not Met: [] Adjusted    Overall Progression Towards Functional goals/ Treatment Progress Update:  [] Patient is progressing as expected towards functional goals listed.    [] Progression is slowed due to complexities/Impairments listed.  [] Progression has been slowed due to co-morbidities.  [x] Plan just implemented, too soon (<30days) to assess goals progression   [] Goals require adjustment due to lack of progress  [] Patient is not

## 2024-11-12 LAB — REPORT: NORMAL

## 2024-11-14 ENCOUNTER — HOSPITAL ENCOUNTER (OUTPATIENT)
Dept: PHYSICAL THERAPY | Age: 74
Setting detail: THERAPIES SERIES
Discharge: HOME | End: 2024-11-14
Attending: STUDENT IN AN ORGANIZED HEALTH CARE EDUCATION/TRAINING PROGRAM
Payer: COMMERCIAL

## 2024-11-14 PROCEDURE — 97112 NEUROMUSCULAR REEDUCATION: CPT

## 2024-11-14 PROCEDURE — 97116 GAIT TRAINING THERAPY: CPT

## 2024-11-14 NOTE — FLOWSHEET NOTE
listed.  [] Progression has been slowed due to co-morbidities.  [x] Plan just implemented, too soon (<30days) to assess goals progression   [] Goals require adjustment due to lack of progress  [] Patient is not progressing as expected and requires additional follow up with physician  [] Other:     TREATMENT PLAN     Frequency/Duration: 2x/week for 8-10 weeks for the following treatment interventions:    Interventions:  Therapeutic Exercise (29663) including: strength training, ROM, and functional mobility  Therapeutic Activities (66125) including: functional mobility training and education.  Neuromuscular Re-education (11582) activation and proprioception, including postural re-education.    Gait Training (06086) for normalization of ambulation patterns and AD training.   Manual Therapy (54565) as indicated to include: Passive Range of Motion, Gr I-IV mobilizations, Soft Tissue Mobilization, Trigger Point Release, and Myofascial Release  Modalities as needed that may include: Cryotherapy and Thermal Agents  Patient education on joint protection, postural re-education, activity modification, and progression of HEP    Plan: Cont POC- Continue emphasis/focus on improving proper muscle recruitment and activation/motor control patterns and kinesthetic sense and proprioception. Next visit plan to emphasize turning around without freezing and overground walking     Electronically Signed by Montserrat Morales, OCTAVIANO  Date: 11/14/2024      Note: Portions of this note have been templated and/or copied from initial evaluation, reassessments and prior notes for documentation efficiency.      Note: If patient does not return for scheduled/recommended follow up visits, this note will serve as a discharge from care along with the most recent update on progress.

## 2024-11-18 ENCOUNTER — HOSPITAL ENCOUNTER (OUTPATIENT)
Dept: PHYSICAL THERAPY | Age: 74
Setting detail: THERAPIES SERIES
Discharge: HOME | End: 2024-11-18
Attending: STUDENT IN AN ORGANIZED HEALTH CARE EDUCATION/TRAINING PROGRAM
Payer: COMMERCIAL

## 2024-11-18 PROCEDURE — 97112 NEUROMUSCULAR REEDUCATION: CPT

## 2024-11-18 NOTE — FLOWSHEET NOTE
[facility]- Outpatient Rehabilitation and Therapy  [address] [place] [address] office: [phone] fax: [phone]    Physical Therapy: TREATMENT/PROGRESS NOTE   Patient: Geoff Wayne (74 y.o. male)   Examination Date: 2024   :  1950 MRN: [id number]   Visit #: 6   Insurance Allowable Auth Needed   BMN, $45 copay []Yes    [x]No    Insurance: Payor: [employer] / Plan: [employer] / Product Type: *No Product type* /   Insurance ID: [id number] - (Medicare Managed)  Secondary Insurance (if applicable):    Treatment Diagnosis:       ICD-10-CM     1. Gait abnormality  R26.9            Medical Diagnosis:  Gait disorder [R26.9]   Referring Physician: Leonel Huynh MD  PCP: Jeff Marks DO     Plan of care signed (Y/N): yes    Date of Patient follow up with Physician: 3 month f/u in 2025     Plan of Care Report: NO  POC update due: (10 visits /OR AUTH LIMITS, whichever is less)  16-20 visits                                            Medical History:  Comorbidities:  Hypertension  Osteoarthritis  Other: hyperlipidemia  Relevant Medical History: takes sinemet for motor control issues (has taken for several years)                                          Precautions/ Contra-indications:           Latex allergy:  NO  Pacemaker:    NO  Contraindications for Manipulation: None    Red Flags:  None    Suicide Screening:   The patient did not verbalize a primary behavioral concern, suicidal ideation, suicidal intent, or demonstrate suicidal behaviors.    Preferred Language for Healthcare:   [x] English       [] other:    SUBJECTIVE EXAMINATION     Patient stated complaint/comment: \"I'm not having a good day balance wise but I don't know why.\"       From Eval:  Pt reports trouble walking and \"jerking\" and stopping while walking. Pt states problems have been going on about 2 years and he has been on sinemet about two years but does not think its helping.

## 2024-11-21 ENCOUNTER — HOSPITAL ENCOUNTER (OUTPATIENT)
Dept: PHYSICAL THERAPY | Age: 74
Setting detail: THERAPIES SERIES
Discharge: HOME | End: 2024-11-21
Attending: STUDENT IN AN ORGANIZED HEALTH CARE EDUCATION/TRAINING PROGRAM
Payer: COMMERCIAL

## 2024-11-21 PROCEDURE — 97112 NEUROMUSCULAR REEDUCATION: CPT

## 2024-11-21 PROCEDURE — 97116 GAIT TRAINING THERAPY: CPT

## 2024-11-21 NOTE — FLOWSHEET NOTE
reps    19.65s for 5 reps, lob with last rep, reports fatigue in calves       Pelayo Balance Scale NT         mCTSIB  30s all conditions, notable forward sway but no LOB        Falls Risk Assessment (30 days): Falls Risk assessed and patient requires intervention due to being higher risk     Exercises/Interventions     Therapeutic Ex (43860)  Resistance Sets/time Reps Notes/Cues/Progressions    3 10  HEP     3 10  HEP     30\" 3 HEP                         NMR re-education (50655)         5 bouts down and back ~50'  Pt had difficulty with technique but no evidence of freezing/shuffling, difficulty with motor planning.    Rockerboard   4'   Int UE support on // bars    180 turns on ground with UE \"guide\"    10 (B)  HEP    [facility] weaving    4  No freezing, significant improvement with each rep, emphasis on turning/weaving          Blaze pods with target practice and emphasis of single leg stance  30\"  6 reps     Blaze pods turning (10' drills)     *significant freezing/shuffling      25#   5 laps  No evidence of freezing forward and backward, freezing frequently side to side    Step taps 6\" 1 min  No evidence of freezing throughout activity   Step up and over  6\" 10x B  Occasional UE assist; no evidence of freezing      3 laps  3 laps    Progressing to without UE assist; no evidence of freezing          No evidence of freezing with stepping activity   Turning around without targets NV                10 (B) In // bars, very smooth turning pattern with single UE support/ with big steps noted and turns in 3 steps this date.            Therapeutic Activity (11858)                            Manual Intervention (74911) Time:                 Gait Training (51227) Time: 15 minutes       Blaze pods walking with speed/agility tests: targets and visual feedback for turning and shuffling to targets. Significant freezing/shuffling and difficulty with task.      Modalities:    No modalities applied this session    Education/Home

## 2024-11-25 ENCOUNTER — HOSPITAL ENCOUNTER (OUTPATIENT)
Dept: PHYSICAL THERAPY | Age: 74
Setting detail: THERAPIES SERIES
Discharge: HOME | End: 2024-11-25
Attending: STUDENT IN AN ORGANIZED HEALTH CARE EDUCATION/TRAINING PROGRAM
Payer: COMMERCIAL

## 2024-11-25 PROCEDURE — 97112 NEUROMUSCULAR REEDUCATION: CPT

## 2024-11-25 NOTE — FLOWSHEET NOTE
calf muscles get very fatigued when he's walking and has the stuttering steps. Pt reports frustration with lack of diagnosis and clarification from neurology. He states his LP results were normal/inconclusive. He denies difficulty on steps, transfers, transitions, or getting in and out of a car. 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 He is not able to do yard work, walk the dog, or ride a bike anymore. Previously received PT for issues (last fall- spring) and felt like maybe it helped. He has tremors in his upper extremities only when his L hand is on the steering wheel.     Per chart review pt with recent neurology visit with Dr. 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 He is on sinemet but does not have a diagnosis of PD, Hx of TEODORO study unremarkable, known tremors of UEs, very jerky tongue.\"     Test used Initial score  10/31/24    Pain Summary VAS 0/10    Functional questionnaire LEFS 58/80 = 28%        OBJECTIVE EXAMINATION     From [facility] on 10/31/24:    Functional Mobility/Balance:       Assistance Level Comments   Functional mobility  Sit to stand Independent     Stand to sit Independent     Bed to chair Independent     Gait Device used: no AD Supervision Gait Deviations (firm surface/linoleum): decreased head and trunk rotation, forward flexed posture, shuffles, decreased step length bilaterally, decreased stance time bilaterally, lack heel strike bilaterally, and very inconsistent. Pt able to walk with typical step length for ~50' at a time intermittently followed by freezing, shuffling, and difficulty initiating steps- reports calf mm fatigue            Score [facility] 10/31/24  Comments   Timed Up and Go (TUG) 9.23s, difficulty with turning, no AD      Sit to Stand   30 second test   Time to complete 5 reps    
24-Mar-2023

## 2024-12-02 ENCOUNTER — TELEPHONE (OUTPATIENT)
Dept: NEUROLOGY | Age: 74
End: 2024-12-02

## 2024-12-02 NOTE — TELEPHONE ENCOUNTER
Patient states that since increasing the Sinemet IR from 1 tablet to 2 tablets TID he is having worsening balance and difficulty walking.     He cancelled his PT today as he feels that isn't working and his balance is to unsteady.    Please advise.

## 2024-12-02 NOTE — TELEPHONE ENCOUNTER
Pt called to report worsening balance and difficulty walking since dosage change to Carbidopa Levodopa.    He cancelled PT for today, he feels that isn't working either and his balance is too unsteady.

## 2024-12-03 NOTE — TELEPHONE ENCOUNTER
Called and updated patient on your recommendations.     He does have some concern as to cutting them in half as they do not break they crumble.     He states he is willing to try that but he expressed that he doesn't feel the medication is working all that well and wants to know if there is some alternative medicine.    Please advise

## 2024-12-03 NOTE — TELEPHONE ENCOUNTER
Reduce Sinemet IR 25/100 from 2 tablets TID to 1.5 tablets TID and monitor for improvement in walking/balance. If no improvement after 1 week, then reduce further to 1 tablet TID (previous dose).

## 2024-12-20 ENCOUNTER — OFFICE VISIT (OUTPATIENT)
Dept: NEUROLOGY | Age: 74
End: 2024-12-20
Payer: COMMERCIAL

## 2024-12-20 VITALS
WEIGHT: 267.9 LBS | HEART RATE: 83 BPM | HEIGHT: 74 IN | OXYGEN SATURATION: 97 % | DIASTOLIC BLOOD PRESSURE: 86 MMHG | SYSTOLIC BLOOD PRESSURE: 173 MMHG | BODY MASS INDEX: 34.38 KG/M2

## 2024-12-20 DIAGNOSIS — G23.1 PROGRESSIVE SUPRANUCLEAR PALSY (HCC): Primary | ICD-10-CM

## 2024-12-20 DIAGNOSIS — G62.9 POLYNEUROPATHY: ICD-10-CM

## 2024-12-20 PROCEDURE — 3077F SYST BP >= 140 MM HG: CPT | Performed by: STUDENT IN AN ORGANIZED HEALTH CARE EDUCATION/TRAINING PROGRAM

## 2024-12-20 PROCEDURE — 1123F ACP DISCUSS/DSCN MKR DOCD: CPT | Performed by: STUDENT IN AN ORGANIZED HEALTH CARE EDUCATION/TRAINING PROGRAM

## 2024-12-20 PROCEDURE — 3079F DIAST BP 80-89 MM HG: CPT | Performed by: STUDENT IN AN ORGANIZED HEALTH CARE EDUCATION/TRAINING PROGRAM

## 2024-12-20 PROCEDURE — 1159F MED LIST DOCD IN RCRD: CPT | Performed by: STUDENT IN AN ORGANIZED HEALTH CARE EDUCATION/TRAINING PROGRAM

## 2024-12-20 PROCEDURE — 99214 OFFICE O/P EST MOD 30 MIN: CPT | Performed by: STUDENT IN AN ORGANIZED HEALTH CARE EDUCATION/TRAINING PROGRAM

## 2024-12-20 RX ORDER — CARBIDOPA AND LEVODOPA 25; 100 MG/1; MG/1
1 TABLET ORAL 3 TIMES DAILY
Qty: 270 TABLET | Refills: 3 | Status: SHIPPED | OUTPATIENT
Start: 2024-12-20 | End: 2025-12-15

## 2024-12-20 RX ORDER — LANOLIN ALCOHOL/MO/W.PET/CERES
1000 CREAM (GRAM) TOPICAL DAILY
COMMUNITY
Start: 2024-11-01

## 2024-12-20 RX ORDER — AMANTADINE HYDROCHLORIDE 100 MG/1
TABLET ORAL
Qty: 60 TABLET | Refills: 11 | Status: SHIPPED | OUTPATIENT
Start: 2024-12-20 | End: 2025-12-27

## 2024-12-20 NOTE — PROGRESS NOTES
his balance worse so he reduced the dose back to 3 tablets per day and balance improved but still worse than it was when he first started Sinemet. His CSF AD biomarker was negative. CSF and serum autoimmune panels were negative. MRI is suspicious for hummingbird sign. He stopped going to PT because he did not find it helpful.         Review of Systems   All other systems reviewed and are negative.         Objective   Neurologic Exam     Mental Status   Oriented to person, place, and time.   Follows 1 step commands.   Attention: normal. Concentration: normal.   Speech: speech is normal   Level of consciousness: alert  Knowledge: consistent with education.   Normal comprehension.     Cranial Nerves     CN II   Visual fields full to confrontation.     CN V   Facial sensation intact.     CN VII   Facial expression full, symmetric.     CN VIII   Hearing: intact    CN IX, X   Palate: symmetric    CN XI   Right sternocleidomastoid strength: normal  Left sternocleidomastoid strength: normal  Right trapezius strength: normal  Left trapezius strength: normal    CN XII   Tongue: not atrophic  Fasciculations: absent  Tongue deviation: none  EOM full range saccadic smooth pursuit and square wave jerks in primary gaze but no nystagmus. Vertical downward saccades questionably slow horizontal normal.      Motor Exam   Muscle bulk: normal  Overall muscle tone: increased (axial predominant rigidity moderate, slight appendicular)  Right arm pronator drift: absent  Left arm pronator drift: absent    Sensory Exam   Light touch normal.     Gait, Coordination, and Reflexes     Gait  Gait: (gait festination, hesitancy with gait initiation, FOG with turns)    Coordination   Finger to nose coordination: normal    Tremor   Resting tremor: absent  Action tremor: left arm and right arm (terminal kinetic tremor)    Reflexes   Right biceps: 2+  Left biceps: 2+  Right patellar: 3+  Left patellar: 3+  Tanvir irregular jerky full amplitude and normal

## 2024-12-20 NOTE — ASSESSMENT & PLAN NOTE
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

## 2024-12-20 NOTE — PATIENT INSTRUCTIONS
Look into this special type of walker with laser and auditory cue used for people with progressive supranuclear palsy:     <[email or web address]>    Look into this observational research study for neurodegenerative disease:    <[email or web address]>

## 2024-12-20 NOTE — ASSESSMENT & PLAN NOTE
Chronic, not at goal (unstable), changes made today: continue Sinemet IR 25/100 x 1 tab TID, start amantadine increase to goal 100mg BID. He has initially higher level gait disorder with FOG, festination, and initiation failure but has developed axial predominant rigidity, mild micrographia, and subtle eye movement abnormalities (square wave jerks in primary gaze, subtle/questionable slowed downward vertical saccade) without postural instability. 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 Extensive work up including autoimmune/paraneoplastic antibodies and AD CSF biomarker negative. The atypical feature for him is jerky action tremor of hands and tongue (not clearly myoclonus). He is interested in participating in the CCBP study at  which may lead to more precise diagnosis. Modest benefit with Sinemet not tolerated at higher doses. If amantadine dose is optimized, then later could consider a trial of methylphenidate or selegiline but HTN would need to be better controlled.

## 2025-01-02 ENCOUNTER — OFFICE VISIT (OUTPATIENT)
Dept: PRIMARY CARE CLINIC | Age: 75
End: 2025-01-02

## 2025-01-02 VITALS
HEIGHT: 74 IN | DIASTOLIC BLOOD PRESSURE: 82 MMHG | WEIGHT: 264 LBS | BODY MASS INDEX: 33.88 KG/M2 | OXYGEN SATURATION: 95 % | TEMPERATURE: 97.2 F | SYSTOLIC BLOOD PRESSURE: 140 MMHG | HEART RATE: 81 BPM

## 2025-01-02 DIAGNOSIS — Z87.891 FORMER CIGARETTE SMOKER: ICD-10-CM

## 2025-01-02 DIAGNOSIS — I10 ESSENTIAL HYPERTENSION: ICD-10-CM

## 2025-01-02 DIAGNOSIS — R07.81 RIB PAIN ON RIGHT SIDE: ICD-10-CM

## 2025-01-02 DIAGNOSIS — Z13.1 DIABETES MELLITUS SCREENING: ICD-10-CM

## 2025-01-02 DIAGNOSIS — G23.1 PROGRESSIVE SUPRANUCLEAR PALSY (HCC): ICD-10-CM

## 2025-01-02 DIAGNOSIS — R09.89 OTHER SPECIFIED SYMPTOMS AND SIGNS INVOLVING THE CIRCULATORY AND RESPIRATORY SYSTEMS: ICD-10-CM

## 2025-01-02 DIAGNOSIS — E03.8 SUBCLINICAL HYPOTHYROIDISM: ICD-10-CM

## 2025-01-02 DIAGNOSIS — I10 ESSENTIAL HYPERTENSION: Primary | ICD-10-CM

## 2025-01-02 DIAGNOSIS — R29.898 BILATERAL LEG WEAKNESS: ICD-10-CM

## 2025-01-02 PROBLEM — L89.893 PRESSURE ULCER OF TOE OF RIGHT FOOT, STAGE 3 (HCC): Status: RESOLVED | Noted: 2023-02-20 | Resolved: 2025-01-02

## 2025-01-02 PROBLEM — T79.6XXA TRAUMATIC RHABDOMYOLYSIS, INITIAL ENCOUNTER (HCC): Status: RESOLVED | Noted: 2023-03-28 | Resolved: 2025-01-02

## 2025-01-02 PROBLEM — T79.6XXA TRAUMATIC RHABDOMYOLYSIS (HCC): Status: RESOLVED | Noted: 2023-03-28 | Resolved: 2025-01-02

## 2025-01-02 RX ORDER — LISINOPRIL AND HYDROCHLOROTHIAZIDE 12.5; 2 MG/1; MG/1
2 TABLET ORAL DAILY
Qty: 180 TABLET | Refills: 1 | Status: SHIPPED | OUTPATIENT
Start: 2025-01-02 | End: 2025-01-02

## 2025-01-02 RX ORDER — ROSUVASTATIN CALCIUM 10 MG/1
10 TABLET, COATED ORAL DAILY
Qty: 90 TABLET | Refills: 3 | Status: SHIPPED | OUTPATIENT
Start: 2025-01-02

## 2025-01-02 RX ORDER — LISINOPRIL AND HYDROCHLOROTHIAZIDE 20; 25 MG/1; MG/1
1 TABLET ORAL DAILY
Qty: 30 TABLET | Refills: 1 | Status: SHIPPED | OUTPATIENT
Start: 2025-01-02

## 2025-01-02 RX ORDER — VITAMIN E 268 MG
400 CAPSULE ORAL DAILY
COMMUNITY
Start: 2024-11-11

## 2025-01-02 SDOH — ECONOMIC STABILITY: FOOD INSECURITY: WITHIN THE PAST 12 MONTHS, THE FOOD YOU BOUGHT JUST DIDN'T LAST AND YOU DIDN'T HAVE MONEY TO GET MORE.: NEVER TRUE

## 2025-01-02 SDOH — ECONOMIC STABILITY: FOOD INSECURITY: WITHIN THE PAST 12 MONTHS, YOU WORRIED THAT YOUR FOOD WOULD RUN OUT BEFORE YOU GOT MONEY TO BUY MORE.: NEVER TRUE

## 2025-01-02 SDOH — ECONOMIC STABILITY: INCOME INSECURITY: HOW HARD IS IT FOR YOU TO PAY FOR THE VERY BASICS LIKE FOOD, HOUSING, MEDICAL CARE, AND HEATING?: NOT HARD AT ALL

## 2025-01-02 ASSESSMENT — ENCOUNTER SYMPTOMS: SHORTNESS OF BREATH: 0

## 2025-01-02 ASSESSMENT — PATIENT HEALTH QUESTIONNAIRE - PHQ9
SUM OF ALL RESPONSES TO PHQ QUESTIONS 1-9: 0
SUM OF ALL RESPONSES TO PHQ QUESTIONS 1-9: 0
2. FEELING DOWN, DEPRESSED OR HOPELESS: NOT AT ALL
SUM OF ALL RESPONSES TO PHQ9 QUESTIONS 1 & 2: 0
SUM OF ALL RESPONSES TO PHQ QUESTIONS 1-9: 0
SUM OF ALL RESPONSES TO PHQ QUESTIONS 1-9: 0
1. LITTLE INTEREST OR PLEASURE IN DOING THINGS: NOT AT ALL

## 2025-01-02 NOTE — PROGRESS NOTES
Pulmonary effort is normal. No respiratory distress.      Breath sounds: Normal breath sounds. No wheezing.   Abdominal:      General: Abdomen is flat. There is no distension.      Palpations: Abdomen is soft.      Tenderness: There is no abdominal tenderness.   Musculoskeletal:         General: No tenderness.      Right lower leg: Edema present.      Left lower leg: Edema present.      Comments: No TTP, localizes pain when present to right lateral aspect of ribs.    Skin:     General: Skin is warm.   Neurological:      General: No focal deficit present.      Mental Status: He is alert and oriented to person, place, and time.         Immunization History   Administered Date(s) Administered    COVID-19, PFIZER PURPLE top, DILUTE for use, (age 12 y+), 30mcg/0.3mL 04/08/2021, 04/29/2021    TDaP, ADACEL (age 10y-64y), BOOSTRIX (age 10y+), IM, 0.5mL 06/01/2022       Health Maintenance   Topic Date Due    Shingles vaccine (1 of 2) Never done    Pneumococcal 65+ years Vaccine (1 of 1 - PCV) Never done    AAA screen  Never done    Lipids  06/01/2023    COVID-19 Vaccine (3 - 2023-24 season) 09/01/2024    Annual Wellness Visit (Medicare)  Never done    Flu vaccine (1) 01/02/2026 (Originally 8/1/2024)    Hepatitis C screen  01/02/2026 (Originally 7/24/1968)    Colorectal Cancer Screen  11/05/2025    Depression Screen  01/02/2026    Respiratory Syncytial Virus (RSV) Pregnant or age 60 yrs+ (1 - 1-dose 75+ series) 07/24/2026    DTaP/Tdap/Td vaccine (2 - Td or Tdap) 06/01/2032    Hepatitis A vaccine  Aged Out    Hepatitis B vaccine  Aged Out    Hib vaccine  Aged Out    Polio vaccine  Aged Out    Meningococcal (ACWY) vaccine  Aged Out    Diabetes screen  Discontinued       PSH, PMH, SH and FH reviewed and noted.  Recent and past labs, tests and consults also reviewed.  Recent or new meds also reviewed.    Jeff Marks,     This dictation was generated by voice recognition computer software.  Although all attempts are made to

## 2025-01-03 ENCOUNTER — HOSPITAL ENCOUNTER (OUTPATIENT)
Dept: ULTRASOUND IMAGING | Age: 75
Discharge: HOME | End: 2025-01-03
Payer: COMMERCIAL

## 2025-01-03 DIAGNOSIS — Z87.891 FORMER CIGARETTE SMOKER: ICD-10-CM

## 2025-01-03 LAB
ALBUMIN SERPL-MCNC: 4.3 G/DL (ref 3.4–5)
ALBUMIN/GLOB SERPL: 2.3 {RATIO} (ref 1.1–2.2)
ALP SERPL-CCNC: 98 U/L (ref 40–129)
ALT SERPL-CCNC: 15 U/L (ref 10–40)
ANION GAP SERPL CALCULATED.3IONS-SCNC: 11 MMOL/L (ref 3–16)
AST SERPL-CCNC: 22 U/L (ref 15–37)
BASOPHILS # BLD: 0 K/UL (ref 0–0.2)
BASOPHILS NFR BLD: 0.7 %
BILIRUB SERPL-MCNC: 0.3 MG/DL (ref 0–1)
BUN SERPL-MCNC: 19 MG/DL (ref 7–20)
CALCIUM SERPL-MCNC: 9.4 MG/DL (ref 8.3–10.6)
CHLORIDE SERPL-SCNC: 107 MMOL/L (ref 99–110)
CHOLEST SERPL-MCNC: 118 MG/DL (ref 0–199)
CO2 SERPL-SCNC: 26 MMOL/L (ref 21–32)
CREAT SERPL-MCNC: 1.1 MG/DL (ref 0.8–1.3)
DEPRECATED RDW RBC AUTO: 13 % (ref 12.4–15.4)
EOSINOPHIL # BLD: 0.2 K/UL (ref 0–0.6)
EOSINOPHIL NFR BLD: 2.7 %
EST. AVERAGE GLUCOSE BLD GHB EST-MCNC: 105.4 MG/DL
GFR SERPLBLD CREATININE-BSD FMLA CKD-EPI: 71 ML/MIN/{1.73_M2}
GLUCOSE SERPL-MCNC: 109 MG/DL (ref 70–99)
HBA1C MFR BLD: 5.3 %
HCT VFR BLD AUTO: 43.4 % (ref 40.5–52.5)
HDLC SERPL-MCNC: 42 MG/DL (ref 40–60)
HGB BLD-MCNC: 14.7 G/DL (ref 13.5–17.5)
LDLC SERPL CALC-MCNC: 65 MG/DL
LYMPHOCYTES # BLD: 1.4 K/UL (ref 1–5.1)
LYMPHOCYTES NFR BLD: 21.6 %
MCH RBC QN AUTO: 30.6 PG (ref 26–34)
MCHC RBC AUTO-ENTMCNC: 33.9 G/DL (ref 31–36)
MCV RBC AUTO: 90.1 FL (ref 80–100)
MONOCYTES # BLD: 0.8 K/UL (ref 0–1.3)
MONOCYTES NFR BLD: 12.5 %
NEUTROPHILS # BLD: 4 K/UL (ref 1.7–7.7)
NEUTROPHILS NFR BLD: 62.5 %
PLATELET # BLD AUTO: 153 K/UL (ref 135–450)
PMV BLD AUTO: 9 FL (ref 5–10.5)
POTASSIUM SERPL-SCNC: 4.5 MMOL/L (ref 3.5–5.1)
PROT SERPL-MCNC: 6.2 G/DL (ref 6.4–8.2)
RBC # BLD AUTO: 4.82 M/UL (ref 4.2–5.9)
SODIUM SERPL-SCNC: 144 MMOL/L (ref 136–145)
T4 FREE SERPL-MCNC: 1.1 NG/DL (ref 0.9–1.8)
TRIGL SERPL-MCNC: 55 MG/DL (ref 0–150)
TSH SERPL DL<=0.005 MIU/L-ACNC: 3.41 UIU/ML (ref 0.27–4.2)
VLDLC SERPL CALC-MCNC: 11 MG/DL
WBC # BLD AUTO: 6.4 K/UL (ref 4–11)

## 2025-01-03 PROCEDURE — 76706 US ABDL AORTA SCREEN AAA: CPT

## 2025-02-10 ENCOUNTER — OFFICE VISIT (OUTPATIENT)
Dept: PRIMARY CARE CLINIC | Age: 75
End: 2025-02-10

## 2025-02-10 VITALS
OXYGEN SATURATION: 98 % | WEIGHT: 261 LBS | SYSTOLIC BLOOD PRESSURE: 105 MMHG | HEART RATE: 84 BPM | TEMPERATURE: 97.3 F | DIASTOLIC BLOOD PRESSURE: 63 MMHG | BODY MASS INDEX: 33.5 KG/M2 | HEIGHT: 74 IN

## 2025-02-10 DIAGNOSIS — G20.C PRIMARY PARKINSONISM (HCC): ICD-10-CM

## 2025-02-10 DIAGNOSIS — G23.1 PROGRESSIVE SUPRANUCLEAR PALSY (HCC): ICD-10-CM

## 2025-02-10 DIAGNOSIS — I10 ESSENTIAL HYPERTENSION: ICD-10-CM

## 2025-02-10 DIAGNOSIS — Z71.89 ACP (ADVANCE CARE PLANNING): ICD-10-CM

## 2025-02-10 DIAGNOSIS — Z00.00 WELCOME TO MEDICARE PREVENTIVE VISIT: Primary | ICD-10-CM

## 2025-02-10 DIAGNOSIS — E78.5 HYPERLIPIDEMIA, UNSPECIFIED HYPERLIPIDEMIA TYPE: ICD-10-CM

## 2025-02-10 SDOH — ECONOMIC STABILITY: FOOD INSECURITY: WITHIN THE PAST 12 MONTHS, YOU WORRIED THAT YOUR FOOD WOULD RUN OUT BEFORE YOU GOT MONEY TO BUY MORE.: NEVER TRUE

## 2025-02-10 SDOH — HEALTH STABILITY: PHYSICAL HEALTH: ON AVERAGE, HOW MANY MINUTES DO YOU ENGAGE IN EXERCISE AT THIS LEVEL?: 60 MIN

## 2025-02-10 SDOH — ECONOMIC STABILITY: INCOME INSECURITY: IN THE LAST 12 MONTHS, WAS THERE A TIME WHEN YOU WERE NOT ABLE TO PAY THE MORTGAGE OR RENT ON TIME?: NO

## 2025-02-10 SDOH — HEALTH STABILITY: PHYSICAL HEALTH: ON AVERAGE, HOW MANY DAYS PER WEEK DO YOU ENGAGE IN MODERATE TO STRENUOUS EXERCISE (LIKE A BRISK WALK)?: 1 DAY

## 2025-02-10 SDOH — ECONOMIC STABILITY: TRANSPORTATION INSECURITY
IN THE PAST 12 MONTHS, HAS THE LACK OF TRANSPORTATION KEPT YOU FROM MEDICAL APPOINTMENTS OR FROM GETTING MEDICATIONS?: NO

## 2025-02-10 SDOH — ECONOMIC STABILITY: FOOD INSECURITY: WITHIN THE PAST 12 MONTHS, THE FOOD YOU BOUGHT JUST DIDN'T LAST AND YOU DIDN'T HAVE MONEY TO GET MORE.: NEVER TRUE

## 2025-02-10 SDOH — ECONOMIC STABILITY: TRANSPORTATION INSECURITY
IN THE PAST 12 MONTHS, HAS LACK OF TRANSPORTATION KEPT YOU FROM MEETINGS, WORK, OR FROM GETTING THINGS NEEDED FOR DAILY LIVING?: NO

## 2025-02-10 ASSESSMENT — LIFESTYLE VARIABLES
HOW OFTEN DURING THE LAST YEAR HAVE YOU NEEDED AN ALCOHOLIC DRINK FIRST THING IN THE MORNING TO GET YOURSELF GOING AFTER A NIGHT OF HEAVY DRINKING: NEVER
HOW OFTEN DO YOU HAVE A DRINK CONTAINING ALCOHOL: 4
HOW OFTEN DURING THE LAST YEAR HAVE YOU FAILED TO DO WHAT WAS NORMALLY EXPECTED FROM YOU BECAUSE OF DRINKING: NEVER
HOW MANY STANDARD DRINKS CONTAINING ALCOHOL DO YOU HAVE ON A TYPICAL DAY: 1
HOW OFTEN DURING THE LAST YEAR HAVE YOU HAD A FEELING OF GUILT OR REMORSE AFTER DRINKING: NEVER
HOW OFTEN DURING THE LAST YEAR HAVE YOU HAD A FEELING OF GUILT OR REMORSE AFTER DRINKING: NEVER
HOW OFTEN DURING THE LAST YEAR HAVE YOU BEEN UNABLE TO REMEMBER WHAT HAPPENED THE NIGHT BEFORE BECAUSE YOU HAD BEEN DRINKING: NEVER
HOW OFTEN DURING THE LAST YEAR HAVE YOU BEEN UNABLE TO REMEMBER WHAT HAPPENED THE NIGHT BEFORE BECAUSE YOU HAD BEEN DRINKING: NEVER
HOW OFTEN DURING THE LAST YEAR HAVE YOU FOUND THAT YOU WERE NOT ABLE TO STOP DRINKING ONCE YOU HAD STARTED: NEVER
HAS A RELATIVE, FRIEND, DOCTOR, OR ANOTHER HEALTH PROFESSIONAL EXPRESSED CONCERN ABOUT YOUR DRINKING OR SUGGESTED YOU CUT DOWN: NO
HOW OFTEN DURING THE LAST YEAR HAVE YOU FAILED TO DO WHAT WAS NORMALLY EXPECTED FROM YOU BECAUSE OF DRINKING: NEVER
HOW OFTEN DURING THE LAST YEAR HAVE YOU NEEDED AN ALCOHOLIC DRINK FIRST THING IN THE MORNING TO GET YOURSELF GOING AFTER A NIGHT OF HEAVY DRINKING: NEVER
HOW MANY STANDARD DRINKS CONTAINING ALCOHOL DO YOU HAVE ON A TYPICAL DAY: 1 OR 2
HAVE YOU OR SOMEONE ELSE BEEN INJURED AS A RESULT OF YOUR DRINKING: NO
HAVE YOU OR SOMEONE ELSE BEEN INJURED AS A RESULT OF YOUR DRINKING: NO
HAS A RELATIVE, FRIEND, DOCTOR, OR ANOTHER HEALTH PROFESSIONAL EXPRESSED CONCERN ABOUT YOUR DRINKING OR SUGGESTED YOU CUT DOWN: NO
HOW OFTEN DO YOU HAVE A DRINK CONTAINING ALCOHOL: 2-3 TIMES A WEEK
HOW OFTEN DURING THE LAST YEAR HAVE YOU FOUND THAT YOU WERE NOT ABLE TO STOP DRINKING ONCE YOU HAD STARTED: NEVER
HOW OFTEN DO YOU HAVE SIX OR MORE DRINKS ON ONE OCCASION: 2

## 2025-02-10 ASSESSMENT — PATIENT HEALTH QUESTIONNAIRE - PHQ9
1. LITTLE INTEREST OR PLEASURE IN DOING THINGS: NOT AT ALL
SUM OF ALL RESPONSES TO PHQ QUESTIONS 1-9: 0
SUM OF ALL RESPONSES TO PHQ9 QUESTIONS 1 & 2: 0
SUM OF ALL RESPONSES TO PHQ QUESTIONS 1-9: 0
2. FEELING DOWN, DEPRESSED OR HOPELESS: NOT AT ALL
SUM OF ALL RESPONSES TO PHQ QUESTIONS 1-9: 0
SUM OF ALL RESPONSES TO PHQ QUESTIONS 1-9: 0

## 2025-02-10 NOTE — PROGRESS NOTES
Allergies  Prior to Visit Medications    Medication Sig Taking? Authorizing Provider   vitamin E 180 MG (400 UNIT) CAPS capsule Take 1 capsule by mouth daily Yes Jace Colon MD   rosuvastatin (CRESTOR) 10 MG tablet Take 1 tablet by mouth daily Yes Jeff Marks DO   lisinopril-hydroCHLOROthiazide (PRINZIDE;ZESTORETIC) 20-25 MG per tablet Take 1 tablet by mouth daily Yes Jeff Marks DO   vitamin B-12 (CYANOCOBALAMIN) 1000 MCG tablet Take 1 tablet by mouth daily Yes Jace Colon MD   Amantadine (SYMMETREL) 100 MG TABS tablet Take 100 mg by mouth daily for 7 days, THEN 100 mg 2 times daily. Yes Leonel Huynh MD   carbidopa-levodopa (SINEMET)  MG per tablet Take 1 tablet by mouth in the morning, at noon, and at bedtime Yes Leonel Huynh MD       [facility] (Including outside providers/suppliers regularly involved in providing care):   Patient Care Team:  Jeff Marks DO as PCP - General (Family Medicine)  Jeff Marks DO as PCP - Empaneled Provider     Recommendations for Preventive Services Due: see orders and patient instructions/AVS.  Recommended screening schedule for the next 5-10 years is provided to the patient in written form: see Patient Instructions/AVS.     Reviewed and updated this visit:  Allergies  Meds  Sexual Hx

## 2025-02-11 ENCOUNTER — CLINICAL DOCUMENTATION (OUTPATIENT)
Dept: SPIRITUAL SERVICES | Age: 75
End: 2025-02-11

## 2025-02-11 NOTE — ACP (ADVANCE CARE PLANNING)
Advance Care Planning   Ambulatory ACP Specialist Patient Outreach    Date:  2/11/2025    ACP Specialist:  Yina Huerta LPN    Outreach call to patient in follow-up to ACP Specialist referral from:Jeff Marks DO    [x] PCP  [] Provider   [] Ambulatory Care Management [] Other     For:                  [x] Advance Directive Assistance              [] Complete Portable DNR order              [] Complete POST/POLST/MOST              [] Code Status Discussion             [] Discuss Goals of Care             [x] Early ACP Decision-Making              [] Other (Specify)    Date Referral Received:2/10/25    Next Step:   [x] ACP scheduled conversation  [] Outreach again in one week               [x] Email / Mail ACP Info Sheets  [x] Email / Mail Advance Directive   [] Closing referral.  Routing closure to referring provider/staff and to ACP Specialist .    [] Closure letter mailed to patient with invitation to contact ACP Specialist if / when ready.   [] Other (Specify here):       [x] At this time, Healthcare Decision Maker Is:     Primary Decision Maker: Wayne[place]ok[place] - Spouse - [phone]          [] Primary agent named in scanned advance directive.    [x] Legal Next of Kin.     [] Unable to determine legal decision maker at this time.    Outreaches:       [x] 1st -  Date:  2/11/25               Intervention:  [x] Spoke with Patient   [] Left Voice mail [x] Email / Mail    [] [employer]  [] Other (Specify) :     Outcomes: Writer contacted patient on home/mobile phone [phone] to discuss ACP.  Patient is agreeable to a conversation with ACP Specialist Ashley Somers on Thursday, February 13, 2025 at 3:00 p.m.  A copy of [place] [employer] forms and ACP information sheets sent to patient's confirmed email address on file with ACP Specialist and Coordinator's contact information included.        Thank you for this referral.

## 2025-02-13 ENCOUNTER — CLINICAL DOCUMENTATION (OUTPATIENT)
Dept: SPIRITUAL SERVICES | Age: 75
End: 2025-02-13

## 2025-02-13 RX ORDER — LANOLIN ALCOHOL/MO/W.PET/CERES
1000 CREAM (GRAM) TOPICAL DAILY
Qty: 30 TABLET | Refills: 3 | Status: SHIPPED | OUTPATIENT
Start: 2025-02-13

## 2025-02-13 RX ORDER — VITAMIN E 268 MG
400 CAPSULE ORAL DAILY
Qty: 30 CAPSULE | Refills: 3 | Status: SHIPPED | OUTPATIENT
Start: 2025-02-13

## 2025-02-13 NOTE — TELEPHONE ENCOUNTER
Refill      vitamin B-12 (CYANOCOBALAMIN) 1000 MCG tablet      vitamin E 180 MG (400 UNIT) CAPS capsule     [facility] [id number] [place] [id number] [place] [phone] - f [phone] [[id number]]

## 2025-02-13 NOTE — TELEPHONE ENCOUNTER
Medication:   Requested Prescriptions     Pending Prescriptions Disp Refills    vitamin B-12 (CYANOCOBALAMIN) 1000 MCG tablet 30 tablet 3     Sig: Take 1 tablet by mouth daily    vitamin E 180 MG (400 UNIT) CAPS capsule 30 capsule 3     Sig: Take 1 capsule by mouth daily        Last Filled:  11/11/24    Patient Phone Number: [phone] (home) [phone] (work)    Last appt: 2/10/2025   Next appt: 8/11/2025    Last OARRS:        No data to display

## 2025-02-28 DIAGNOSIS — I10 ESSENTIAL HYPERTENSION: ICD-10-CM

## 2025-02-28 RX ORDER — LISINOPRIL AND HYDROCHLOROTHIAZIDE 20; 25 MG/1; MG/1
1 TABLET ORAL DAILY
Qty: 100 TABLET | Refills: 0 | Status: SHIPPED | OUTPATIENT
Start: 2025-02-28

## 2025-02-28 NOTE — TELEPHONE ENCOUNTER
Refill    100 DAY SUPPLY    lisinopril-hydroCHLOROthiazide (PRINZIDE;ZESTORETIC) 20-25 MG per tablet     [facility] [id number] - [place] [id number] [place] [phone] - F [phone] [[id number]]

## 2025-03-12 DIAGNOSIS — I10 ESSENTIAL HYPERTENSION: ICD-10-CM

## 2025-03-12 RX ORDER — LISINOPRIL AND HYDROCHLOROTHIAZIDE 20; 25 MG/1; MG/1
1 TABLET ORAL DAILY
Qty: 30 TABLET | OUTPATIENT
Start: 2025-03-12

## 2025-06-02 ENCOUNTER — OFFICE VISIT (OUTPATIENT)
Dept: NEUROLOGY | Age: 75
End: 2025-06-02
Payer: COMMERCIAL

## 2025-06-02 VITALS
SYSTOLIC BLOOD PRESSURE: 119 MMHG | HEIGHT: 74 IN | OXYGEN SATURATION: 98 % | WEIGHT: 272.4 LBS | HEART RATE: 76 BPM | DIASTOLIC BLOOD PRESSURE: 65 MMHG | BODY MASS INDEX: 34.96 KG/M2

## 2025-06-02 DIAGNOSIS — G23.1 PROGRESSIVE SUPRANUCLEAR PALSY (HCC): ICD-10-CM

## 2025-06-02 PROCEDURE — 3078F DIAST BP <80 MM HG: CPT | Performed by: STUDENT IN AN ORGANIZED HEALTH CARE EDUCATION/TRAINING PROGRAM

## 2025-06-02 PROCEDURE — 1159F MED LIST DOCD IN RCRD: CPT | Performed by: STUDENT IN AN ORGANIZED HEALTH CARE EDUCATION/TRAINING PROGRAM

## 2025-06-02 PROCEDURE — 3074F SYST BP LT 130 MM HG: CPT | Performed by: STUDENT IN AN ORGANIZED HEALTH CARE EDUCATION/TRAINING PROGRAM

## 2025-06-02 PROCEDURE — 1123F ACP DISCUSS/DSCN MKR DOCD: CPT | Performed by: STUDENT IN AN ORGANIZED HEALTH CARE EDUCATION/TRAINING PROGRAM

## 2025-06-02 PROCEDURE — 99213 OFFICE O/P EST LOW 20 MIN: CPT | Performed by: STUDENT IN AN ORGANIZED HEALTH CARE EDUCATION/TRAINING PROGRAM

## 2025-06-02 RX ORDER — CARBIDOPA AND LEVODOPA 25; 100 MG/1; MG/1
1 TABLET ORAL 3 TIMES DAILY
Qty: 270 TABLET | Refills: 3 | Status: SHIPPED | OUTPATIENT
Start: 2025-06-02 | End: 2026-05-28

## 2025-06-02 NOTE — PATIENT INSTRUCTIONS
Let me know if you want to try a new medication called selegiline in the future with the goal of reducing freezing events when turning and starting to walk thereby improving your walking ability.     Reduce amantadine from 1 capsule 2 times per day to 1 capsule daily for 1 week then stop.

## 2025-06-02 NOTE — PROGRESS NOTES
History of Present Illness  follow-up visit.  Unaccompanied.    Since the last visit on 12/2024, he has completed physical therapy at , which he found to be beneficial. Walking remains a challenging task, and he experiences occasional minor falls. He does not use any assistive devices such as a cane or walker and has not made any home modifications to aid his mobility. He was previously on a higher dose of carbidopa-levodopa but did not tolerate it well. Currently, he takes carbidopa-levodopa three times daily at 8:00 AM, 12:00 PM, and 4:00 PM. Physical therapy at [facility] was discontinued due to ineffectiveness. His exercise routine includes walking, gym workouts, and weightlifting, and he has been diligent with his home balance exercises.    He is uncertain about the efficacy of amantadine and expresses a desire to discontinue its use. He reports no leg rashes or skin discoloration. However, he has experienced memory issues since starting the medication, including difficulty recalling the day of the week upon waking.    INTERVAL: Since last visit, he has completed physical therapy at  and discontinued physical therapy at [facility] due to ineffectiveness. He continues to experience walking difficulties and occasional minor falls.    SOCIAL HISTORY:    Hobbies: Walking, gym workouts, weightlifting    /65 (BP Site: Left Upper Arm)   Pulse 76   Ht 1.88 m (6' 2\")   Wt 123.6 kg (272 lb 6.4 oz)   SpO2 98%   BMI 34.97 kg/m²       Neurological Exam  Mental Status  Awake and alert. Speech is normal. Fund of knowledge is appropriate for level of education.  Negative applause sign.    Cranial Nerves  CN III, IV, VI: Extraocular movements intact bilaterally. No nystagmus. Normal saccades. Diminished smooth pursuit. Normal lids and orbits bilaterally. +rare square wave jerk.  CN VII:  Right: There is no facial weakness.  Left: There is no facial weakness.  CN VIII:  Right: Hearing is normal.  Left: Hearing is

## 2025-06-21 DIAGNOSIS — I10 ESSENTIAL HYPERTENSION: ICD-10-CM

## 2025-06-23 RX ORDER — LISINOPRIL AND HYDROCHLOROTHIAZIDE 20; 25 MG/1; MG/1
1 TABLET ORAL DAILY
Qty: 100 TABLET | Refills: 0 | Status: SHIPPED | OUTPATIENT
Start: 2025-06-23

## 2025-06-23 NOTE — TELEPHONE ENCOUNTER
Medication:   Requested Prescriptions     Pending Prescriptions Disp Refills    lisinopril-hydroCHLOROthiazide (PRINZIDE;ZESTORETIC) 20-25 MG per tablet [Pharmacy Med Name: LISINOPRIL-HCTZ 20-25 MG TAB] 100 tablet 0     Sig: TAKE 1 TABLET BY MOUTH DAILY        Last Filled:  2/28/25    Patient Phone Number: [phone] (home) [phone] (work)    Last appt: 2/10/2025   Next appt: 8/11/2025    Last OARRS:        No data to display

## 2025-08-11 ENCOUNTER — OFFICE VISIT (OUTPATIENT)
Dept: PRIMARY CARE CLINIC | Age: 75
End: 2025-08-11
Payer: COMMERCIAL

## 2025-08-11 VITALS
HEIGHT: 74 IN | SYSTOLIC BLOOD PRESSURE: 112 MMHG | DIASTOLIC BLOOD PRESSURE: 66 MMHG | HEART RATE: 76 BPM | BODY MASS INDEX: 31.95 KG/M2 | WEIGHT: 249 LBS | TEMPERATURE: 97.3 F | OXYGEN SATURATION: 96 %

## 2025-08-11 DIAGNOSIS — I10 ESSENTIAL HYPERTENSION: Primary | ICD-10-CM

## 2025-08-11 DIAGNOSIS — N40.1 BENIGN PROSTATIC HYPERPLASIA WITH URINARY FREQUENCY: ICD-10-CM

## 2025-08-11 DIAGNOSIS — G23.1 PROGRESSIVE SUPRANUCLEAR PALSY (HCC): ICD-10-CM

## 2025-08-11 DIAGNOSIS — E78.5 HYPERLIPIDEMIA, UNSPECIFIED HYPERLIPIDEMIA TYPE: ICD-10-CM

## 2025-08-11 DIAGNOSIS — R35.0 BENIGN PROSTATIC HYPERPLASIA WITH URINARY FREQUENCY: ICD-10-CM

## 2025-08-11 DIAGNOSIS — G20.C PRIMARY PARKINSONISM (HCC): ICD-10-CM

## 2025-08-11 PROBLEM — M75.102 NONTRAUMATIC TEAR OF LEFT ROTATOR CUFF: Status: RESOLVED | Noted: 2022-05-02 | Resolved: 2025-08-11

## 2025-08-11 PROBLEM — R27.0 ATAXIA: Status: RESOLVED | Noted: 2022-12-07 | Resolved: 2025-08-11

## 2025-08-11 PROBLEM — G47.33 OSA (OBSTRUCTIVE SLEEP APNEA): Status: ACTIVE | Noted: 2024-10-29

## 2025-08-11 PROBLEM — K63.5 POLYP OF COLON: Status: RESOLVED | Noted: 2022-02-28 | Resolved: 2025-08-11

## 2025-08-11 PROBLEM — E03.8 SUBCLINICAL HYPOTHYROIDISM: Status: RESOLVED | Noted: 2022-02-13 | Resolved: 2025-08-11

## 2025-08-11 PROBLEM — E66.09 CLASS 1 OBESITY DUE TO EXCESS CALORIES WITH SERIOUS COMORBIDITY IN ADULT: Status: RESOLVED | Noted: 2022-05-02 | Resolved: 2025-08-11

## 2025-08-11 PROBLEM — E66.811 CLASS 1 OBESITY DUE TO EXCESS CALORIES WITH SERIOUS COMORBIDITY IN ADULT: Status: RESOLVED | Noted: 2022-05-02 | Resolved: 2025-08-11

## 2025-08-11 PROBLEM — H26.9 CATARACT: Status: RESOLVED | Noted: 2023-04-19 | Resolved: 2025-08-11

## 2025-08-11 PROBLEM — B35.1 ONYCHOMYCOSIS: Status: RESOLVED | Noted: 2022-02-28 | Resolved: 2025-08-11

## 2025-08-11 PROCEDURE — 3074F SYST BP LT 130 MM HG: CPT | Performed by: STUDENT IN AN ORGANIZED HEALTH CARE EDUCATION/TRAINING PROGRAM

## 2025-08-11 PROCEDURE — 1123F ACP DISCUSS/DSCN MKR DOCD: CPT | Performed by: STUDENT IN AN ORGANIZED HEALTH CARE EDUCATION/TRAINING PROGRAM

## 2025-08-11 PROCEDURE — 3078F DIAST BP <80 MM HG: CPT | Performed by: STUDENT IN AN ORGANIZED HEALTH CARE EDUCATION/TRAINING PROGRAM

## 2025-08-11 PROCEDURE — 99214 OFFICE O/P EST MOD 30 MIN: CPT | Performed by: STUDENT IN AN ORGANIZED HEALTH CARE EDUCATION/TRAINING PROGRAM

## 2025-08-11 RX ORDER — LISINOPRIL AND HYDROCHLOROTHIAZIDE 20; 25 MG/1; MG/1
1 TABLET ORAL DAILY
Qty: 100 TABLET | Refills: 0 | Status: CANCELLED | OUTPATIENT
Start: 2025-08-11

## 2025-08-11 ASSESSMENT — ENCOUNTER SYMPTOMS: SHORTNESS OF BREATH: 0
